# Patient Record
Sex: MALE | Race: WHITE | Employment: FULL TIME | URBAN - METROPOLITAN AREA
[De-identification: names, ages, dates, MRNs, and addresses within clinical notes are randomized per-mention and may not be internally consistent; named-entity substitution may affect disease eponyms.]

---

## 2024-05-16 ENCOUNTER — APPOINTMENT (OUTPATIENT)
Dept: NEUROLOGY | Age: 24
DRG: 917 | End: 2024-05-16
Payer: COMMERCIAL

## 2024-05-16 ENCOUNTER — APPOINTMENT (OUTPATIENT)
Dept: CT IMAGING | Age: 24
DRG: 917 | End: 2024-05-16
Payer: COMMERCIAL

## 2024-05-16 ENCOUNTER — APPOINTMENT (OUTPATIENT)
Dept: GENERAL RADIOLOGY | Age: 24
DRG: 917 | End: 2024-05-16
Payer: COMMERCIAL

## 2024-05-16 ENCOUNTER — HOSPITAL ENCOUNTER (INPATIENT)
Age: 24
LOS: 4 days | Discharge: PSYCHIATRIC OR SUBSTANCE USE FACILITY | DRG: 917 | End: 2024-05-20
Attending: EMERGENCY MEDICINE | Admitting: INTERNAL MEDICINE
Payer: COMMERCIAL

## 2024-05-16 DIAGNOSIS — T50.902D INTENTIONAL OVERDOSE, SUBSEQUENT ENCOUNTER: Primary | ICD-10-CM

## 2024-05-16 PROBLEM — T50.902A INTENTIONAL OVERDOSE (HCC): Status: ACTIVE | Noted: 2024-05-16

## 2024-05-16 LAB
ALBUMIN SERPL-MCNC: 4.6 G/DL (ref 3.5–5.2)
ALBUMIN SERPL-MCNC: 4.8 G/DL (ref 3.5–5.2)
ALP SERPL-CCNC: 80 U/L (ref 40–129)
ALP SERPL-CCNC: 86 U/L (ref 40–129)
ALT SERPL-CCNC: 10 U/L (ref 0–40)
ALT SERPL-CCNC: 12 U/L (ref 0–40)
AMPHET UR QL SCN: POSITIVE
ANION GAP SERPL CALCULATED.3IONS-SCNC: 13 MMOL/L (ref 7–16)
ANION GAP SERPL CALCULATED.3IONS-SCNC: 20 MMOL/L (ref 7–16)
APAP SERPL-MCNC: <5 UG/ML (ref 10–30)
AST SERPL-CCNC: 18 U/L (ref 0–39)
AST SERPL-CCNC: 20 U/L (ref 0–39)
BARBITURATES UR QL SCN: NEGATIVE
BASOPHILS # BLD: 0.14 K/UL (ref 0–0.2)
BASOPHILS # BLD: 0.17 K/UL (ref 0–0.2)
BASOPHILS NFR BLD: 1 % (ref 0–2)
BASOPHILS NFR BLD: 2 % (ref 0–2)
BENZODIAZ UR QL: POSITIVE
BILIRUB SERPL-MCNC: 0.2 MG/DL (ref 0–1.2)
BILIRUB SERPL-MCNC: 0.3 MG/DL (ref 0–1.2)
BUN SERPL-MCNC: 6 MG/DL (ref 6–20)
BUN SERPL-MCNC: 8 MG/DL (ref 6–20)
BUPRENORPHINE UR QL: NEGATIVE
CALCIUM SERPL-MCNC: 8.3 MG/DL (ref 8.6–10.2)
CALCIUM SERPL-MCNC: 9 MG/DL (ref 8.6–10.2)
CANNABINOIDS UR QL SCN: POSITIVE
CHLORIDE SERPL-SCNC: 103 MMOL/L (ref 98–107)
CHLORIDE SERPL-SCNC: 105 MMOL/L (ref 98–107)
CK SERPL-CCNC: 213 U/L (ref 20–200)
CO2 SERPL-SCNC: 16 MMOL/L (ref 22–29)
CO2 SERPL-SCNC: 27 MMOL/L (ref 22–29)
COCAINE UR QL SCN: NEGATIVE
CREAT SERPL-MCNC: 0.8 MG/DL (ref 0.7–1.2)
CREAT SERPL-MCNC: 0.9 MG/DL (ref 0.7–1.2)
EKG ATRIAL RATE: 105 BPM
EKG ATRIAL RATE: 109 BPM
EKG ATRIAL RATE: 129 BPM
EKG P AXIS: 79 DEGREES
EKG P AXIS: 83 DEGREES
EKG P AXIS: 86 DEGREES
EKG P-R INTERVAL: 150 MS
EKG P-R INTERVAL: 188 MS
EKG P-R INTERVAL: 190 MS
EKG Q-T INTERVAL: 320 MS
EKG Q-T INTERVAL: 346 MS
EKG Q-T INTERVAL: 348 MS
EKG QRS DURATION: 100 MS
EKG QRS DURATION: 88 MS
EKG QRS DURATION: 94 MS
EKG QTC CALCULATION (BAZETT): 459 MS
EKG QTC CALCULATION (BAZETT): 465 MS
EKG QTC CALCULATION (BAZETT): 468 MS
EKG R AXIS: 60 DEGREES
EKG R AXIS: 62 DEGREES
EKG R AXIS: 66 DEGREES
EKG T AXIS: 66 DEGREES
EKG T AXIS: 69 DEGREES
EKG T AXIS: 72 DEGREES
EKG VENTRICULAR RATE: 105 BPM
EKG VENTRICULAR RATE: 109 BPM
EKG VENTRICULAR RATE: 129 BPM
EOSINOPHIL # BLD: 0.01 K/UL (ref 0.05–0.5)
EOSINOPHIL # BLD: 0.16 K/UL (ref 0.05–0.5)
EOSINOPHILS RELATIVE PERCENT: 0 % (ref 0–6)
EOSINOPHILS RELATIVE PERCENT: 2 % (ref 0–6)
ERYTHROCYTE [DISTWIDTH] IN BLOOD BY AUTOMATED COUNT: 12.9 % (ref 11.5–15)
ERYTHROCYTE [DISTWIDTH] IN BLOOD BY AUTOMATED COUNT: 12.9 % (ref 11.5–15)
ETHANOLAMINE SERPL-MCNC: 43 MG/DL (ref 0–0.08)
FENTANYL UR QL: NEGATIVE
FIO2: 21
GFR, ESTIMATED: >90 ML/MIN/1.73M2
GFR, ESTIMATED: >90 ML/MIN/1.73M2
GLUCOSE SERPL-MCNC: 103 MG/DL (ref 74–99)
GLUCOSE SERPL-MCNC: 119 MG/DL (ref 74–99)
HCT VFR BLD AUTO: 41.5 % (ref 37–54)
HCT VFR BLD AUTO: 42.6 % (ref 37–54)
HGB BLD-MCNC: 13.6 G/DL (ref 12.5–16.5)
HGB BLD-MCNC: 14.4 G/DL (ref 12.5–16.5)
IMM GRANULOCYTES # BLD AUTO: 0.1 K/UL (ref 0–0.58)
IMM GRANULOCYTES # BLD AUTO: <0.03 K/UL (ref 0–0.58)
IMM GRANULOCYTES NFR BLD: 0 % (ref 0–5)
IMM GRANULOCYTES NFR BLD: 1 % (ref 0–5)
LACTATE BLDV-SCNC: 8.1 MMOL/L (ref 0.5–2.2)
LYMPHOCYTES NFR BLD: 1.09 K/UL (ref 1.5–4)
LYMPHOCYTES NFR BLD: 2.12 K/UL (ref 1.5–4)
LYMPHOCYTES RELATIVE PERCENT: 24 % (ref 20–42)
LYMPHOCYTES RELATIVE PERCENT: 6 % (ref 20–42)
MAGNESIUM SERPL-MCNC: 1.8 MG/DL (ref 1.6–2.6)
MAGNESIUM SERPL-MCNC: 2 MG/DL (ref 1.6–2.6)
MCH RBC QN AUTO: 27.9 PG (ref 26–35)
MCH RBC QN AUTO: 28.7 PG (ref 26–35)
MCHC RBC AUTO-ENTMCNC: 31.9 G/DL (ref 32–34.5)
MCHC RBC AUTO-ENTMCNC: 34.7 G/DL (ref 32–34.5)
MCV RBC AUTO: 82.7 FL (ref 80–99.9)
MCV RBC AUTO: 87.5 FL (ref 80–99.9)
METHADONE UR QL: NEGATIVE
MONOCYTES NFR BLD: 0.63 K/UL (ref 0.1–0.95)
MONOCYTES NFR BLD: 0.92 K/UL (ref 0.1–0.95)
MONOCYTES NFR BLD: 5 % (ref 2–12)
MONOCYTES NFR BLD: 7 % (ref 2–12)
NEUTROPHILS NFR BLD: 65 % (ref 43–80)
NEUTROPHILS NFR BLD: 88 % (ref 43–80)
NEUTS SEG NFR BLD: 16.71 K/UL (ref 1.8–7.3)
NEUTS SEG NFR BLD: 5.83 K/UL (ref 1.8–7.3)
O2 DELIVERY DEVICE: ABNORMAL
OPIATES UR QL SCN: NEGATIVE
OXYCODONE UR QL SCN: NEGATIVE
PCP UR QL SCN: NEGATIVE
PHOSPHATE SERPL-MCNC: 2.8 MG/DL (ref 2.5–4.5)
PLATELET # BLD AUTO: 378 K/UL (ref 130–450)
PLATELET # BLD AUTO: 390 K/UL (ref 130–450)
PMV BLD AUTO: 9.5 FL (ref 7–12)
PMV BLD AUTO: 9.5 FL (ref 7–12)
POC HCO3: 26.1 MMOL/L (ref 22–26)
POC O2 SATURATION: 98 % (ref 92–98.5)
POC PCO2: 41.6 MM HG (ref 35–45)
POC PH: 7.41 (ref 7.35–7.45)
POC PO2: 104.7 MM HG (ref 80–100)
POSITIVE BASE EXCESS, ART: 1.2 MMOL/L (ref 0–3)
POTASSIUM SERPL-SCNC: 3.2 MMOL/L (ref 3.5–5)
POTASSIUM SERPL-SCNC: 3.7 MMOL/L (ref 3.5–5)
PROCALCITONIN SERPL-MCNC: 0.04 NG/ML (ref 0–0.08)
PROT SERPL-MCNC: 7.1 G/DL (ref 6.4–8.3)
PROT SERPL-MCNC: 7.3 G/DL (ref 6.4–8.3)
RBC # BLD AUTO: 4.87 M/UL (ref 3.8–5.8)
RBC # BLD AUTO: 5.02 M/UL (ref 3.8–5.8)
SALICYLATES SERPL-MCNC: <0.3 MG/DL (ref 0–30)
SAMPLE SITE: ABNORMAL
SODIUM SERPL-SCNC: 141 MMOL/L (ref 132–146)
SODIUM SERPL-SCNC: 143 MMOL/L (ref 132–146)
T4 FREE SERPL-MCNC: 1.5 NG/DL (ref 0.9–1.7)
TEST INFORMATION: ABNORMAL
TOXIC TRICYCLIC SC,BLOOD: NEGATIVE
TSH SERPL DL<=0.05 MIU/L-ACNC: 5.98 UIU/ML (ref 0.27–4.2)
WBC OTHER # BLD: 19 K/UL (ref 4.5–11.5)
WBC OTHER # BLD: 8.9 K/UL (ref 4.5–11.5)

## 2024-05-16 PROCEDURE — 83735 ASSAY OF MAGNESIUM: CPT

## 2024-05-16 PROCEDURE — C9113 INJ PANTOPRAZOLE SODIUM, VIA: HCPCS | Performed by: INTERNAL MEDICINE

## 2024-05-16 PROCEDURE — 80143 DRUG ASSAY ACETAMINOPHEN: CPT

## 2024-05-16 PROCEDURE — 93010 ELECTROCARDIOGRAM REPORT: CPT | Performed by: INTERNAL MEDICINE

## 2024-05-16 PROCEDURE — 2000000000 HC ICU R&B

## 2024-05-16 PROCEDURE — 93005 ELECTROCARDIOGRAM TRACING: CPT | Performed by: STUDENT IN AN ORGANIZED HEALTH CARE EDUCATION/TRAINING PROGRAM

## 2024-05-16 PROCEDURE — 6360000002 HC RX W HCPCS: Performed by: INTERNAL MEDICINE

## 2024-05-16 PROCEDURE — 84439 ASSAY OF FREE THYROXINE: CPT

## 2024-05-16 PROCEDURE — 83605 ASSAY OF LACTIC ACID: CPT

## 2024-05-16 PROCEDURE — 93005 ELECTROCARDIOGRAM TRACING: CPT | Performed by: EMERGENCY MEDICINE

## 2024-05-16 PROCEDURE — 99222 1ST HOSP IP/OBS MODERATE 55: CPT | Performed by: INTERNAL MEDICINE

## 2024-05-16 PROCEDURE — 96365 THER/PROPH/DIAG IV INF INIT: CPT

## 2024-05-16 PROCEDURE — 2580000003 HC RX 258: Performed by: STUDENT IN AN ORGANIZED HEALTH CARE EDUCATION/TRAINING PROGRAM

## 2024-05-16 PROCEDURE — 80179 DRUG ASSAY SALICYLATE: CPT

## 2024-05-16 PROCEDURE — G0480 DRUG TEST DEF 1-7 CLASSES: HCPCS

## 2024-05-16 PROCEDURE — 95816 EEG AWAKE AND DROWSY: CPT

## 2024-05-16 PROCEDURE — 84145 PROCALCITONIN (PCT): CPT

## 2024-05-16 PROCEDURE — 93005 ELECTROCARDIOGRAM TRACING: CPT | Performed by: INTERNAL MEDICINE

## 2024-05-16 PROCEDURE — 80307 DRUG TEST PRSMV CHEM ANLYZR: CPT

## 2024-05-16 PROCEDURE — 6360000002 HC RX W HCPCS: Performed by: STUDENT IN AN ORGANIZED HEALTH CARE EDUCATION/TRAINING PROGRAM

## 2024-05-16 PROCEDURE — 99223 1ST HOSP IP/OBS HIGH 75: CPT | Performed by: PSYCHIATRY & NEUROLOGY

## 2024-05-16 PROCEDURE — 85025 COMPLETE CBC W/AUTO DIFF WBC: CPT

## 2024-05-16 PROCEDURE — 99291 CRITICAL CARE FIRST HOUR: CPT | Performed by: INTERNAL MEDICINE

## 2024-05-16 PROCEDURE — 84100 ASSAY OF PHOSPHORUS: CPT

## 2024-05-16 PROCEDURE — 6360000002 HC RX W HCPCS

## 2024-05-16 PROCEDURE — 99285 EMERGENCY DEPT VISIT HI MDM: CPT

## 2024-05-16 PROCEDURE — 71045 X-RAY EXAM CHEST 1 VIEW: CPT

## 2024-05-16 PROCEDURE — 2580000003 HC RX 258: Performed by: EMERGENCY MEDICINE

## 2024-05-16 PROCEDURE — 70450 CT HEAD/BRAIN W/O DYE: CPT

## 2024-05-16 PROCEDURE — 2580000003 HC RX 258: Performed by: INTERNAL MEDICINE

## 2024-05-16 PROCEDURE — 95816 EEG AWAKE AND DROWSY: CPT | Performed by: PSYCHIATRY & NEUROLOGY

## 2024-05-16 PROCEDURE — 82550 ASSAY OF CK (CPK): CPT

## 2024-05-16 PROCEDURE — 84443 ASSAY THYROID STIM HORMONE: CPT

## 2024-05-16 PROCEDURE — 96366 THER/PROPH/DIAG IV INF ADDON: CPT

## 2024-05-16 PROCEDURE — A4216 STERILE WATER/SALINE, 10 ML: HCPCS | Performed by: INTERNAL MEDICINE

## 2024-05-16 PROCEDURE — 80053 COMPREHEN METABOLIC PANEL: CPT

## 2024-05-16 PROCEDURE — 96375 TX/PRO/DX INJ NEW DRUG ADDON: CPT

## 2024-05-16 PROCEDURE — 82803 BLOOD GASES ANY COMBINATION: CPT

## 2024-05-16 RX ORDER — HEPARIN SODIUM 5000 [USP'U]/ML
5000 INJECTION, SOLUTION INTRAVENOUS; SUBCUTANEOUS EVERY 8 HOURS
Status: DISCONTINUED | OUTPATIENT
Start: 2024-05-16 | End: 2024-05-19 | Stop reason: HOSPADM

## 2024-05-16 RX ORDER — LEVETIRACETAM 500 MG/5ML
1000 INJECTION, SOLUTION, CONCENTRATE INTRAVENOUS EVERY 12 HOURS
Status: DISCONTINUED | OUTPATIENT
Start: 2024-05-16 | End: 2024-05-19 | Stop reason: HOSPADM

## 2024-05-16 RX ORDER — LORAZEPAM 2 MG/ML
INJECTION INTRAMUSCULAR
Status: COMPLETED
Start: 2024-05-16 | End: 2024-05-16

## 2024-05-16 RX ORDER — LEVETIRACETAM 500 MG/5ML
INJECTION, SOLUTION, CONCENTRATE INTRAVENOUS
Status: COMPLETED
Start: 2024-05-16 | End: 2024-05-16

## 2024-05-16 RX ORDER — POTASSIUM CHLORIDE 7.45 MG/ML
10 INJECTION INTRAVENOUS
Status: DISPENSED | OUTPATIENT
Start: 2024-05-16 | End: 2024-05-16

## 2024-05-16 RX ORDER — ONDANSETRON 2 MG/ML
4 INJECTION INTRAMUSCULAR; INTRAVENOUS ONCE
Status: COMPLETED | OUTPATIENT
Start: 2024-05-16 | End: 2024-05-16

## 2024-05-16 RX ORDER — LORAZEPAM 2 MG/ML
0.5 INJECTION INTRAMUSCULAR ONCE
Status: COMPLETED | OUTPATIENT
Start: 2024-05-16 | End: 2024-05-16

## 2024-05-16 RX ORDER — LEVETIRACETAM 500 MG/5ML
1500 INJECTION, SOLUTION, CONCENTRATE INTRAVENOUS ONCE
Status: COMPLETED | OUTPATIENT
Start: 2024-05-16 | End: 2024-05-16

## 2024-05-16 RX ORDER — SODIUM CHLORIDE, SODIUM LACTATE, POTASSIUM CHLORIDE, CALCIUM CHLORIDE 600; 310; 30; 20 MG/100ML; MG/100ML; MG/100ML; MG/100ML
INJECTION, SOLUTION INTRAVENOUS CONTINUOUS
Status: DISCONTINUED | OUTPATIENT
Start: 2024-05-16 | End: 2024-05-18

## 2024-05-16 RX ORDER — 0.9 % SODIUM CHLORIDE 0.9 %
1000 INTRAVENOUS SOLUTION INTRAVENOUS ONCE
Status: COMPLETED | OUTPATIENT
Start: 2024-05-16 | End: 2024-05-16

## 2024-05-16 RX ORDER — LORAZEPAM 2 MG/ML
2 INJECTION INTRAMUSCULAR ONCE
Status: COMPLETED | OUTPATIENT
Start: 2024-05-16 | End: 2024-05-16

## 2024-05-16 RX ADMIN — SODIUM CHLORIDE, POTASSIUM CHLORIDE, SODIUM LACTATE AND CALCIUM CHLORIDE: 600; 310; 30; 20 INJECTION, SOLUTION INTRAVENOUS at 23:16

## 2024-05-16 RX ADMIN — LORAZEPAM 0.5 MG: 2 INJECTION INTRAMUSCULAR; INTRAVENOUS at 07:50

## 2024-05-16 RX ADMIN — SODIUM CHLORIDE 1000 ML: 9 INJECTION, SOLUTION INTRAVENOUS at 08:57

## 2024-05-16 RX ADMIN — POTASSIUM CHLORIDE 10 MEQ: 7.46 INJECTION, SOLUTION INTRAVENOUS at 07:29

## 2024-05-16 RX ADMIN — PANTOPRAZOLE SODIUM 40 MG: 40 INJECTION, POWDER, FOR SOLUTION INTRAVENOUS at 13:17

## 2024-05-16 RX ADMIN — LORAZEPAM 2 MG: 2 INJECTION INTRAMUSCULAR at 11:18

## 2024-05-16 RX ADMIN — LEVETIRACETAM 1000 MG: 100 INJECTION, SOLUTION INTRAVENOUS at 23:16

## 2024-05-16 RX ADMIN — SODIUM CHLORIDE 1000 ML: 9 INJECTION, SOLUTION INTRAVENOUS at 08:02

## 2024-05-16 RX ADMIN — ONDANSETRON HYDROCHLORIDE 4 MG: 2 INJECTION, SOLUTION INTRAMUSCULAR; INTRAVENOUS at 07:50

## 2024-05-16 RX ADMIN — LORAZEPAM 2 MG: 2 INJECTION INTRAMUSCULAR; INTRAVENOUS at 11:18

## 2024-05-16 RX ADMIN — LEVETIRACETAM 1500 MG: 100 INJECTION, SOLUTION INTRAVENOUS at 11:17

## 2024-05-16 RX ADMIN — LEVETIRACETAM 1500 MG: 500 INJECTION, SOLUTION, CONCENTRATE INTRAVENOUS at 11:17

## 2024-05-16 RX ADMIN — HEPARIN SODIUM 5000 UNITS: 5000 INJECTION INTRAVENOUS; SUBCUTANEOUS at 21:58

## 2024-05-16 RX ADMIN — POTASSIUM CHLORIDE 10 MEQ: 7.46 INJECTION, SOLUTION INTRAVENOUS at 08:57

## 2024-05-16 RX ADMIN — HEPARIN SODIUM 5000 UNITS: 5000 INJECTION INTRAVENOUS; SUBCUTANEOUS at 13:17

## 2024-05-16 RX ADMIN — SODIUM CHLORIDE, POTASSIUM CHLORIDE, SODIUM LACTATE AND CALCIUM CHLORIDE: 600; 310; 30; 20 INJECTION, SOLUTION INTRAVENOUS at 12:32

## 2024-05-16 NOTE — PROCEDURES
[facility] Neurodiagnostic Report    MRN: [id number]  PATIENT NAME: Ho Meade  DATE OF REPORT: 2024    DATE OF SERVICE: 2024  PHYSICIAN NAME: Harris Guallpa DO  STUDY ORDERED BY: Dr Barbosa      Patient's : 2000  Patient's Age: 23 y.o.  Gender: male    PROCEDURE: Routine EEG with video      Clinical Interpretation:   This abnormal study showed evidence of:    A moderate nonspecific encephalopathy    No seizures or epileptiform discharges were noted during this study.      ____________________________  Electronically signed by: Harris Guallpa DO, 2024 3:23 PM      Patient Clinical Information   Reason for Study: The patient is undergoing evaluation for seizure(s)  Patient State: Stuporous  Primary neurological diagnosis: Seizure  Primary indication for monitoring: Diagnosis of nonconvulsive seizures    Pertinent Medications and Treatments    levetiracetam     Sedatives administered: No  Intubated: No  Pharmacological paralytic: No    Reporting Period  Start of Study: 1321, 2024   End of Study:  1343, 2024       EEG Description  Digital video and scalp EEG monitoring was performed using the standard protocol for this laboratory. Scalp electrodes were applied in the international 10/20 system. Multiple digital montage arrangements were utilized for evaluation. EKG and video were recorded.     Background:      Occipital rhythm (posterior dominant rhythm or PDR): Absent   Voltage: low to medium  Organization: fair  Reactivity to eye opening/closure: minimal    Drowsiness: present - minimally excessive irregular delta activity noted  Sleep: Absent    Comments: There is moderately excessive generalized irregular beta activity noted consistent with medication effect.    Technical and Activation Procedures:  Hyperventilation: Not done  Photic stimulation: Done - physiologic driving noted  Reactivity to stimulation: Present    Abnormalities:    I.

## 2024-05-16 NOTE — H&P
[facility] Hospitalist Group History and Physical          PCP: No primary care provider on file.    Date of Admission: 5/16/2024    Date of Service: Pt seen/examined on 5/16/2024 and is admitted to Inpatient with expected LOS greater than two midnights due to medical therapy.     Chief Complaint:  had concerns including Drug Overdose (Pt states he was trying to end his life and took a whole bottle of medications ).    History Of Present Illness:    Mr. Ho Meade, a 23 y.o. year old male  who  has no past medical history on file.  Patient presented to the [place] emergency department due to intentional overdose on Wellbutrin.  He reportedly took 20 pills of Wellbutrin last night.  Patient was stable in the emergency department decision was made to transfer patient to ICU for monitoring.  However, on the way to [facility], he was noted to have seizure-like activity lasting for about 20 seconds.  He again had a witnessed generalized tonic-clonic seizure lasting about 40 seconds on arrival to the ICU. He was loaded with Keppra and stat EEG and neurology consult ordered.       Past Medical History:    History reviewed. No pertinent past medical history.    Past Surgical History:    History reviewed. No pertinent surgical history.    Medications Prior to Admission:      Prior to Admission medications    Not on File       Allergies:  Patient has no known allergies.    Social History:    TOBACCO:   reports that he has been smoking cigarettes and e-cigarettes. He does not have any smokeless tobacco history on file.  ETOH:   has no history on file for alcohol use.    Family History:    Reviewed in detail and negative for DM, CAD, Cancer, CVA. Positive as follows\"  History reviewed. No pertinent family history.    REVIEW OF SYSTEMS:   Pertinent positives as noted in the HPI. All other systems reviewed and negative.    PHYSICAL EXAM:  /86   Pulse (!) 106   Temp 97.7 °F (36.5 °C)

## 2024-05-16 NOTE — ED PROVIDER NOTES
HPI:  5/16/24, Time: 5:48 AM EDT         Ho Meade is a 23 y.o. male presenting to the ED for suicidal, beginning midnight about 6 hours ago ago.  The complaint has been persistent, severe in severity, and worsened by nothing.  Patient seems somewhat slow to respond slightly tachycardic he did not have any seizures there is been no trauma this was definitely a suicide attempt to end his life I did contact poison control that said that he needs to be watched on the monitor possible ICU for at least 24 hours possible seizures or QT prolongation or widening of the QRS, he has no trauma at this point his pupils equal round reactive to light he has no focal neurological deficits is very slow to respond    ROS:   Pertinent positives and negatives are stated within HPI, all other systems reviewed and are negative.  --------------------------------------------- PAST HISTORY ---------------------------------------------  Past Medical History:  has no past medical history on file.    Past Surgical History:  has no past surgical history on file.    Social History:  reports that he has been smoking cigarettes and e-cigarettes. He does not have any smokeless tobacco history on file. He reports current drug use. Drugs: Marijuana (Weed) and Other-see comments.    Family History: family history is not on file.     The patient’s home medications have been reviewed.    Allergies: Patient has no known allergies.    ---------------------------------------------------PHYSICAL EXAM--------------------------------------  }  Constitutional/General: Alert and oriented x2, well appearing, non toxic in NAD slow to respond year wrong and president wroong  Head: Normocephalic and atraumatic  Eyes: PERRL, EOMI  Mouth: Oropharynx clear, handling secretions, no trismus  Neck: Supple, full ROM, non tender to palpation in the midline, no stridor, no crepitus, no meningeal signs  Pulmonary: Lungs clear to auscultation bilaterally, no wheezes,

## 2024-05-17 PROBLEM — R56.9 PROVOKED SEIZURE (HCC): Status: ACTIVE | Noted: 2024-05-17

## 2024-05-17 LAB
ALBUMIN SERPL-MCNC: 3.9 G/DL (ref 3.5–5.2)
ALP SERPL-CCNC: 69 U/L (ref 40–129)
ALT SERPL-CCNC: 10 U/L (ref 0–40)
ANION GAP SERPL CALCULATED.3IONS-SCNC: 11 MMOL/L (ref 7–16)
AST SERPL-CCNC: 15 U/L (ref 0–39)
BASOPHILS # BLD: 0.04 K/UL (ref 0–0.2)
BASOPHILS NFR BLD: 0 % (ref 0–2)
BILIRUB SERPL-MCNC: 0.5 MG/DL (ref 0–1.2)
BUN SERPL-MCNC: 4 MG/DL (ref 6–20)
CALCIUM SERPL-MCNC: 8.6 MG/DL (ref 8.6–10.2)
CHLORIDE SERPL-SCNC: 107 MMOL/L (ref 98–107)
CO2 SERPL-SCNC: 23 MMOL/L (ref 22–29)
CREAT SERPL-MCNC: 0.8 MG/DL (ref 0.7–1.2)
EOSINOPHIL # BLD: 0 K/UL (ref 0.05–0.5)
EOSINOPHILS RELATIVE PERCENT: 0 % (ref 0–6)
ERYTHROCYTE [DISTWIDTH] IN BLOOD BY AUTOMATED COUNT: 13.2 % (ref 11.5–15)
GFR, ESTIMATED: >90 ML/MIN/1.73M2
GLUCOSE SERPL-MCNC: 96 MG/DL (ref 74–99)
HCT VFR BLD AUTO: 37 % (ref 37–54)
HGB BLD-MCNC: 12.5 G/DL (ref 12.5–16.5)
IMM GRANULOCYTES # BLD AUTO: 0.04 K/UL (ref 0–0.58)
IMM GRANULOCYTES NFR BLD: 0 % (ref 0–5)
INR PPP: 1.2
LACTATE BLDV-SCNC: 0.9 MMOL/L (ref 0.5–2.2)
LYMPHOCYTES NFR BLD: 1 K/UL (ref 1.5–4)
LYMPHOCYTES RELATIVE PERCENT: 9 % (ref 20–42)
MAGNESIUM SERPL-MCNC: 1.7 MG/DL (ref 1.6–2.6)
MCH RBC QN AUTO: 28.9 PG (ref 26–35)
MCHC RBC AUTO-ENTMCNC: 33.8 G/DL (ref 32–34.5)
MCV RBC AUTO: 85.5 FL (ref 80–99.9)
MONOCYTES NFR BLD: 0.89 K/UL (ref 0.1–0.95)
MONOCYTES NFR BLD: 8 % (ref 2–12)
NEUTROPHILS NFR BLD: 81 % (ref 43–80)
NEUTS SEG NFR BLD: 8.63 K/UL (ref 1.8–7.3)
PARTIAL THROMBOPLASTIN TIME: 35.3 SEC (ref 24.5–35.1)
PHOSPHATE SERPL-MCNC: 2.7 MG/DL (ref 2.5–4.5)
PLATELET # BLD AUTO: 334 K/UL (ref 130–450)
PMV BLD AUTO: 9.6 FL (ref 7–12)
POTASSIUM SERPL-SCNC: 3.5 MMOL/L (ref 3.5–5)
PROT SERPL-MCNC: 6.1 G/DL (ref 6.4–8.3)
PROTHROMBIN TIME: 12.7 SEC (ref 9.3–12.4)
RBC # BLD AUTO: 4.33 M/UL (ref 3.8–5.8)
SODIUM SERPL-SCNC: 141 MMOL/L (ref 132–146)
WBC OTHER # BLD: 10.6 K/UL (ref 4.5–11.5)

## 2024-05-17 PROCEDURE — 99232 SBSQ HOSP IP/OBS MODERATE 35: CPT

## 2024-05-17 PROCEDURE — 80053 COMPREHEN METABOLIC PANEL: CPT

## 2024-05-17 PROCEDURE — 6360000002 HC RX W HCPCS: Performed by: INTERNAL MEDICINE

## 2024-05-17 PROCEDURE — C9113 INJ PANTOPRAZOLE SODIUM, VIA: HCPCS | Performed by: INTERNAL MEDICINE

## 2024-05-17 PROCEDURE — A4216 STERILE WATER/SALINE, 10 ML: HCPCS | Performed by: INTERNAL MEDICINE

## 2024-05-17 PROCEDURE — 99232 SBSQ HOSP IP/OBS MODERATE 35: CPT | Performed by: STUDENT IN AN ORGANIZED HEALTH CARE EDUCATION/TRAINING PROGRAM

## 2024-05-17 PROCEDURE — 2580000003 HC RX 258: Performed by: INTERNAL MEDICINE

## 2024-05-17 PROCEDURE — 99291 CRITICAL CARE FIRST HOUR: CPT | Performed by: INTERNAL MEDICINE

## 2024-05-17 PROCEDURE — 85730 THROMBOPLASTIN TIME PARTIAL: CPT

## 2024-05-17 PROCEDURE — 83605 ASSAY OF LACTIC ACID: CPT

## 2024-05-17 PROCEDURE — 85610 PROTHROMBIN TIME: CPT

## 2024-05-17 PROCEDURE — 85025 COMPLETE CBC W/AUTO DIFF WBC: CPT

## 2024-05-17 PROCEDURE — 83735 ASSAY OF MAGNESIUM: CPT

## 2024-05-17 PROCEDURE — 2000000000 HC ICU R&B

## 2024-05-17 PROCEDURE — 84100 ASSAY OF PHOSPHORUS: CPT

## 2024-05-17 PROCEDURE — 6360000002 HC RX W HCPCS: Performed by: NURSE PRACTITIONER

## 2024-05-17 RX ORDER — MAGNESIUM SULFATE IN WATER 40 MG/ML
2000 INJECTION, SOLUTION INTRAVENOUS ONCE
Status: COMPLETED | OUTPATIENT
Start: 2024-05-17 | End: 2024-05-17

## 2024-05-17 RX ORDER — POTASSIUM CHLORIDE 20 MEQ/1
20 TABLET, EXTENDED RELEASE ORAL ONCE
Status: DISCONTINUED | OUTPATIENT
Start: 2024-05-17 | End: 2024-05-20 | Stop reason: HOSPADM

## 2024-05-17 RX ORDER — ONDANSETRON 2 MG/ML
4 INJECTION INTRAMUSCULAR; INTRAVENOUS EVERY 6 HOURS PRN
Status: DISCONTINUED | OUTPATIENT
Start: 2024-05-17 | End: 2024-05-20 | Stop reason: HOSPADM

## 2024-05-17 RX ORDER — ONDANSETRON 2 MG/ML
8 INJECTION INTRAMUSCULAR; INTRAVENOUS ONCE
Status: COMPLETED | OUTPATIENT
Start: 2024-05-17 | End: 2024-05-17

## 2024-05-17 RX ADMIN — HEPARIN SODIUM 5000 UNITS: 5000 INJECTION INTRAVENOUS; SUBCUTANEOUS at 21:51

## 2024-05-17 RX ADMIN — ONDANSETRON 8 MG: 2 INJECTION INTRAMUSCULAR; INTRAVENOUS at 08:33

## 2024-05-17 RX ADMIN — SODIUM CHLORIDE, POTASSIUM CHLORIDE, SODIUM LACTATE AND CALCIUM CHLORIDE: 600; 310; 30; 20 INJECTION, SOLUTION INTRAVENOUS at 10:44

## 2024-05-17 RX ADMIN — SODIUM CHLORIDE, POTASSIUM CHLORIDE, SODIUM LACTATE AND CALCIUM CHLORIDE: 600; 310; 30; 20 INJECTION, SOLUTION INTRAVENOUS at 20:49

## 2024-05-17 RX ADMIN — PANTOPRAZOLE SODIUM 40 MG: 40 INJECTION, POWDER, FOR SOLUTION INTRAVENOUS at 08:33

## 2024-05-17 RX ADMIN — LEVETIRACETAM 1000 MG: 100 INJECTION, SOLUTION INTRAVENOUS at 11:14

## 2024-05-17 RX ADMIN — HEPARIN SODIUM 5000 UNITS: 5000 INJECTION INTRAVENOUS; SUBCUTANEOUS at 05:44

## 2024-05-17 RX ADMIN — HEPARIN SODIUM 5000 UNITS: 5000 INJECTION INTRAVENOUS; SUBCUTANEOUS at 13:19

## 2024-05-17 RX ADMIN — LEVETIRACETAM 1000 MG: 100 INJECTION, SOLUTION INTRAVENOUS at 22:55

## 2024-05-17 RX ADMIN — MAGNESIUM SULFATE HEPTAHYDRATE 2000 MG: 40 INJECTION, SOLUTION INTRAVENOUS at 06:45

## 2024-05-17 NOTE — PLAN OF CARE
Problem: Skin/Tissue Integrity  Goal: Absence of new skin breakdown  Description: 1.  Monitor for areas of redness and/or skin breakdown  2.  Assess vascular access sites hourly  3.  Every 4-6 hours minimum:  Change oxygen saturation probe site  4.  Every 4-6 hours:  If on nasal continuous positive airway pressure, respiratory therapy assess nares and determine need for appliance change or resting period.  Outcome: Progressing     Problem: Safety - Adult  Goal: Free from fall injury  Outcome: Progressing     Problem: ABCDS Injury Assessment  Goal: Absence of physical injury  Outcome: Progressing     Problem: Pain  Goal: Verbalizes/displays adequate comfort level or baseline comfort level  Outcome: Progressing

## 2024-05-17 NOTE — CONSULTS
Department of Psychiatry  Behavioral Health Consult    REASON FOR CONSULT: Intentional overdose    CONSULTING PHYSICIAN: Qamar Barbosa MD     History obtained from: Patient interview chart review    HISTORY OF PRESENT ILLNESS:     The patient is a 23 y.o. male with significant past psychiatric history of depression who presents with intentional Wellbutrin overdose initially presenting to the [facility] emergency department and was transferred to [facility] and [place] [facility] he did have witnessed seizure-like activity in route to the [facility] was provided a loading dose of Keppra and a stat EEG neurology was consulted.  Psychiatry has been consulted for intentional overdose.    Patient is pink slip for intentional overdose.  He is pink slipped for intentional overdose.    Once he is medically stabilized please call extension [phone] to facilitate transfer to inpatient behavioral health or referral to [facility] if [facility] is at capacity.           Past Medical History:    History reviewed. No pertinent past medical history.    Past Surgical History:    History reviewed. No pertinent surgical history.    Medications Prior to Admission:   No medications prior to admission.    Allergies:  Patient has no known allergies.    FAMILY/SOCIAL HISTORY:  History reviewed. No pertinent family history.  Social History     Socioeconomic History    Marital status: Single     Spouse name: Not on file    Number of children: Not on file    Years of education: Not on file    Highest education level: Not on file   Occupational History    Not on file   Tobacco Use    Smoking status: Every Day     Types: Cigarettes, E-Cigarettes    Smokeless tobacco: Not on file   Substance and Sexual Activity    Alcohol use: Not on file    Drug use: Yes     Types: Marijuana (Weed), Other-see comments     Comment: Adderall    Sexual activity: Not on file   Other Topics Concern    Not on file   Social History 
[facility]  Neurology Consult    Date:  5/16/2024  Patient Name:  Ho Meade  YOB: 2000  MRN: [id number]     PCP:  No primary care provider on file.   Referring:  No ref. provider found      Chief Complaint: seizure    History obtained from: patient's family, chart    Assessment  Ho Meade is a 23 y.o. male admitted following Wellbutrin overdose with subsequent provoked seizures. Given the half-life of the medication he has been having worsening mentation over the course of the day.       Plan  EEG done today without any evidence of epileptiform discharges  MRI brain w/wo contrast pending  Continue Keppra for present, will likely not be needed long term  Seizure safety precautions        History of Present Illness:  Ho Meade is a 23 y.o. right handed male presenting for evaluation of seizure in the setting of suspected Wellbutrin overdose. The patient's parents report that he is suspected to have taken an overdose of his Wellbutrin sometime early the morning of admission vs night before. He initially presented to the [facility] ED. En route to [facility] he had a brief tonic clonic seizure and had another again in the MICU.     Since transfer he has also become more somnolent and has reportedly been hallucinating.       Social History:  Social History     Tobacco Use    Smoking status: Every Day     Types: Cigarettes, E-Cigarettes   Substance Use Topics    Drug use: Yes     Types: Marijuana (Weed), Other-see comments     Comment: Adderall        Current Medications:      Current Facility-Administered Medications   Medication Dose Route Frequency Provider Last Rate Last Admin    levETIRAcetam (KEPPRA) injection 1,000 mg  1,000 mg IntraVENous Q12H Qamar Barbosa MD        pantoprazole (PROTONIX) 40 mg in sodium chloride (PF) 0.9 % 10 mL injection  40 mg IntraVENous Daily Qamar Barbosa MD   40 mg at 05/16/24 1317    heparin (porcine) injection 5,000 Units  5,000 
with 20 pills  Suspected GTCS witnessed in MICU for 30 to 40 seconds  Suspected drug-induced seizures  Immediate regaining of consciousness and started following commands within 5 minutes  History of dysthymia  Suspected MDD    Plan:    Neuro: Following commands, no focal neurodeficit.  Loaded with Keppra 1500 mg and continue Keppra 1000 mg every 12, Ativan 2 mg given during weakness G TCS.  EEG stat ordered, reviewed CT scan of the head from [facility] without any acute abnormality.  Poison control was contacted by [facility] and they recommended to monitor electrolytes and replace, monitor lactic acid, monitor QTc.  Pulmonary: Comfortable in room air maintaining SpO2 96%  Cardiovascular: Hemodynamically stable, monitor QTc  Abdomen/GI: Keep n.p.o. for now  Renal: Monitor urine output, monitor electrolytes and replace electrolytes.  MSK: No active issues   Skin: No active issues   Hematology: No active issues  Endocrine: Monitor BS  DVT/GI: Prophylaxis:  Code Status: Full Code  Disposition: ICU  Total Critical care time spent  35 mins. This did not include any procedures.         Qamar Barbosa MD  Critical Care

## 2024-05-17 NOTE — CARE COORDINATION
CM transition of care.  Patient presented to [facility] ED secondary to concerns for drug overdose.  Reportedly pt stated he was trying to end his life and took 20 Wellbutrin pills.  Pt admitted to MICU.  Had seizure like activity in route to main hospital as well as on presentation to the the unit.  Neurology and psychiatry on consult.  EEG completed yesterday.  MRI Brain pending.  IV Keppra q 12 and IVF @ 100 ml/hr.  CO at the beside.  Met with patient mother at the bedside.  Pt lives alone and is normally independent, drives and works full time job.  He does see a therapist at [facility], however his therapist has been on maternity leave and he has not seen anyone else in the interim.  Discharge plan pending psychiatry input and plan.  CM/ANTONIO to follow.  Loki Pena RN  [phone].      Case Management Assessment  Initial Evaluation    Date/Time of Evaluation: 5/17/2024 11:50 AM  Assessment Completed by: Jeaneth Pena RN    If patient is discharged prior to next notation, then this note serves as note for discharge by case management.    Patient Name: Ho Meade                   YOB: 2000  Diagnosis: Intentional overdose, subsequent encounter [T50.902D]  Intentional drug overdose, subsequent encounter [T50.902D]                   Date / Time: 5/16/2024  5:31 AM    Patient Admission Status: Inpatient   Readmission Risk (Low < 19, Mod (19-27), High > 27): Readmission Risk Score: 10    Current PCP: No primary care provider on file.  PCP verified by CM? Yes    Chart Reviewed:       History Provided by: Child/Family  Patient Orientation: Alert and Oriented    Patient Cognition: Alert    Hospitalization in the last 30 days (Readmission):  No    If yes, Readmission Assessment in  Navigator will be completed.    Advance Directives:      Code Status: Full Code   Patient's Primary Decision Maker is:      Primary Decision Maker: jasmyn malin - Aunt/Uncle - [phone]    Primary Decision Maker:

## 2024-05-18 ENCOUNTER — APPOINTMENT (OUTPATIENT)
Dept: MRI IMAGING | Age: 24
DRG: 917 | End: 2024-05-18
Payer: COMMERCIAL

## 2024-05-18 LAB
ALBUMIN SERPL-MCNC: 4.1 G/DL (ref 3.5–5.2)
ALP SERPL-CCNC: 69 U/L (ref 40–129)
ALT SERPL-CCNC: 11 U/L (ref 0–40)
ANION GAP SERPL CALCULATED.3IONS-SCNC: 14 MMOL/L (ref 7–16)
AST SERPL-CCNC: 20 U/L (ref 0–39)
BASOPHILS # BLD: 0.12 K/UL (ref 0–0.2)
BASOPHILS NFR BLD: 1 % (ref 0–2)
BILIRUB SERPL-MCNC: 0.5 MG/DL (ref 0–1.2)
BUN SERPL-MCNC: 6 MG/DL (ref 6–20)
CALCIUM SERPL-MCNC: 8.8 MG/DL (ref 8.6–10.2)
CHLORIDE SERPL-SCNC: 103 MMOL/L (ref 98–107)
CO2 SERPL-SCNC: 21 MMOL/L (ref 22–29)
CREAT SERPL-MCNC: 0.8 MG/DL (ref 0.7–1.2)
EOSINOPHIL # BLD: 0.13 K/UL (ref 0.05–0.5)
EOSINOPHILS RELATIVE PERCENT: 1 % (ref 0–6)
ERYTHROCYTE [DISTWIDTH] IN BLOOD BY AUTOMATED COUNT: 13.5 % (ref 11.5–15)
GFR, ESTIMATED: >90 ML/MIN/1.73M2
GLUCOSE SERPL-MCNC: 85 MG/DL (ref 74–99)
HCT VFR BLD AUTO: 40.6 % (ref 37–54)
HGB BLD-MCNC: 13.5 G/DL (ref 12.5–16.5)
IMM GRANULOCYTES # BLD AUTO: 0.04 K/UL (ref 0–0.58)
IMM GRANULOCYTES NFR BLD: 0 % (ref 0–5)
INR PPP: 1.1
LYMPHOCYTES NFR BLD: 2.1 K/UL (ref 1.5–4)
LYMPHOCYTES RELATIVE PERCENT: 23 % (ref 20–42)
MAGNESIUM SERPL-MCNC: 2.1 MG/DL (ref 1.6–2.6)
MCH RBC QN AUTO: 29 PG (ref 26–35)
MCHC RBC AUTO-ENTMCNC: 33.3 G/DL (ref 32–34.5)
MCV RBC AUTO: 87.1 FL (ref 80–99.9)
MONOCYTES NFR BLD: 0.9 K/UL (ref 0.1–0.95)
MONOCYTES NFR BLD: 10 % (ref 2–12)
NEUTROPHILS NFR BLD: 64 % (ref 43–80)
NEUTS SEG NFR BLD: 5.85 K/UL (ref 1.8–7.3)
PARTIAL THROMBOPLASTIN TIME: 38.3 SEC (ref 24.5–35.1)
PHOSPHATE SERPL-MCNC: 3.2 MG/DL (ref 2.5–4.5)
PLATELET # BLD AUTO: 317 K/UL (ref 130–450)
PMV BLD AUTO: 9.7 FL (ref 7–12)
POTASSIUM SERPL-SCNC: 3.9 MMOL/L (ref 3.5–5)
PROT SERPL-MCNC: 6.6 G/DL (ref 6.4–8.3)
PROTHROMBIN TIME: 11.6 SEC (ref 9.3–12.4)
RBC # BLD AUTO: 4.66 M/UL (ref 3.8–5.8)
SODIUM SERPL-SCNC: 138 MMOL/L (ref 132–146)
WBC OTHER # BLD: 9.1 K/UL (ref 4.5–11.5)

## 2024-05-18 PROCEDURE — A9577 INJ MULTIHANCE: HCPCS | Performed by: RADIOLOGY

## 2024-05-18 PROCEDURE — 2580000003 HC RX 258: Performed by: INTERNAL MEDICINE

## 2024-05-18 PROCEDURE — 83735 ASSAY OF MAGNESIUM: CPT

## 2024-05-18 PROCEDURE — 85025 COMPLETE CBC W/AUTO DIFF WBC: CPT

## 2024-05-18 PROCEDURE — C9113 INJ PANTOPRAZOLE SODIUM, VIA: HCPCS | Performed by: INTERNAL MEDICINE

## 2024-05-18 PROCEDURE — 6360000002 HC RX W HCPCS: Performed by: INTERNAL MEDICINE

## 2024-05-18 PROCEDURE — 85730 THROMBOPLASTIN TIME PARTIAL: CPT

## 2024-05-18 PROCEDURE — A4216 STERILE WATER/SALINE, 10 ML: HCPCS | Performed by: INTERNAL MEDICINE

## 2024-05-18 PROCEDURE — 80053 COMPREHEN METABOLIC PANEL: CPT

## 2024-05-18 PROCEDURE — 70553 MRI BRAIN STEM W/O & W/DYE: CPT

## 2024-05-18 PROCEDURE — 99232 SBSQ HOSP IP/OBS MODERATE 35: CPT | Performed by: STUDENT IN AN ORGANIZED HEALTH CARE EDUCATION/TRAINING PROGRAM

## 2024-05-18 PROCEDURE — 99232 SBSQ HOSP IP/OBS MODERATE 35: CPT

## 2024-05-18 PROCEDURE — 84100 ASSAY OF PHOSPHORUS: CPT

## 2024-05-18 PROCEDURE — 85610 PROTHROMBIN TIME: CPT

## 2024-05-18 PROCEDURE — 6360000004 HC RX CONTRAST MEDICATION: Performed by: RADIOLOGY

## 2024-05-18 PROCEDURE — 2060000000 HC ICU INTERMEDIATE R&B

## 2024-05-18 RX ADMIN — PANTOPRAZOLE SODIUM 40 MG: 40 INJECTION, POWDER, FOR SOLUTION INTRAVENOUS at 08:55

## 2024-05-18 RX ADMIN — GADOBENATE DIMEGLUMINE 10 ML: 529 INJECTION, SOLUTION INTRAVENOUS at 15:14

## 2024-05-18 RX ADMIN — LEVETIRACETAM 1000 MG: 100 INJECTION, SOLUTION INTRAVENOUS at 12:25

## 2024-05-18 RX ADMIN — LEVETIRACETAM 1000 MG: 100 INJECTION, SOLUTION INTRAVENOUS at 22:28

## 2024-05-18 RX ADMIN — SODIUM CHLORIDE, POTASSIUM CHLORIDE, SODIUM LACTATE AND CALCIUM CHLORIDE: 600; 310; 30; 20 INJECTION, SOLUTION INTRAVENOUS at 05:53

## 2024-05-18 RX ADMIN — HEPARIN SODIUM 5000 UNITS: 5000 INJECTION INTRAVENOUS; SUBCUTANEOUS at 05:52

## 2024-05-18 NOTE — PLAN OF CARE
Problem: Skin/Tissue Integrity  Goal: Absence of new skin breakdown  Description: 1.  Monitor for areas of redness and/or skin breakdown  2.  Assess vascular access sites hourly  3.  Every 4-6 hours minimum:  Change oxygen saturation probe site  4.  Every 4-6 hours:  If on nasal continuous positive airway pressure, respiratory therapy assess nares and determine need for appliance change or resting period.  Outcome: Progressing     Problem: Safety - Adult  Goal: Free from fall injury  Outcome: Progressing     Problem: ABCDS Injury Assessment  Goal: Absence of physical injury  Outcome: Progressing     Problem: Confusion  Goal: Confusion, delirium, dementia, or psychosis is improved or at baseline  Description: INTERVENTIONS:  1. Assess for possible contributors to thought disturbance, including medications, impaired vision or hearing, underlying metabolic abnormalities, dehydration, psychiatric diagnoses, and notify attending LIP  2. [place] high risk fall precautions, as indicated  3. Provide frequent short contacts to provide reality reorientation, refocusing and direction  4. Decrease environmental stimuli, including noise as appropriate  5. Monitor and intervene to maintain adequate nutrition, hydration, elimination, sleep and activity  6. If unable to ensure safety without constant attention obtain sitter and review sitter guidelines with assigned personnel  7. Initiate Psychosocial CNS and Spiritual Care consult, as indicated  Outcome: Progressing     Problem: Pain  Goal: Verbalizes/displays adequate comfort level or baseline comfort level  Outcome: Progressing

## 2024-05-19 PROCEDURE — 99239 HOSP IP/OBS DSCHRG MGMT >30: CPT | Performed by: STUDENT IN AN ORGANIZED HEALTH CARE EDUCATION/TRAINING PROGRAM

## 2024-05-19 PROCEDURE — 2060000000 HC ICU INTERMEDIATE R&B

## 2024-05-19 ASSESSMENT — PAIN SCALES - GENERAL
PAINLEVEL_OUTOF10: 0
PAINLEVEL_OUTOF10: 0

## 2024-05-19 NOTE — PLAN OF CARE
Problem: Discharge Planning  Goal: Discharge to home or other facility with appropriate resources  Outcome: Progressing     Problem: Skin/Tissue Integrity  Goal: Absence of new skin breakdown  Description: 1.  Monitor for areas of redness and/or skin breakdown  2.  Assess vascular access sites hourly  3.  Every 4-6 hours minimum:  Change oxygen saturation probe site  4.  Every 4-6 hours:  If on nasal continuous positive airway pressure, respiratory therapy assess nares and determine need for appliance change or resting period.  Outcome: Progressing     Problem: Safety - Adult  Goal: Free from fall injury  Outcome: Progressing     Problem: ABCDS Injury Assessment  Goal: Absence of physical injury  Outcome: Progressing     Problem: Confusion  Goal: Confusion, delirium, dementia, or psychosis is improved or at baseline  Description: INTERVENTIONS:  1. Assess for possible contributors to thought disturbance, including medications, impaired vision or hearing, underlying metabolic abnormalities, dehydration, psychiatric diagnoses, and notify attending LIP  2. [place] high risk fall precautions, as indicated  3. Provide frequent short contacts to provide reality reorientation, refocusing and direction  4. Decrease environmental stimuli, including noise as appropriate  5. Monitor and intervene to maintain adequate nutrition, hydration, elimination, sleep and activity  6. If unable to ensure safety without constant attention obtain sitter and review sitter guidelines with assigned personnel  7. Initiate Psychosocial CNS and Spiritual Care consult, as indicated  Outcome: Progressing     Problem: Risk for Elopement  Goal: Patient will not exit the unit/facility without proper excort  Outcome: Progressing     Problem: Pain  Goal: Verbalizes/displays adequate comfort level or baseline comfort level  Outcome: Progressing

## 2024-05-19 NOTE — PLAN OF CARE
Problem: Discharge Planning  Goal: Discharge to home or other facility with appropriate resources  Outcome: Progressing     Problem: Skin/Tissue Integrity  Goal: Absence of new skin breakdown  Description: 1.  Monitor for areas of redness and/or skin breakdown  2.  Assess vascular access sites hourly  3.  Every 4-6 hours minimum:  Change oxygen saturation probe site  4.  Every 4-6 hours:  If on nasal continuous positive airway pressure, respiratory therapy assess nares and determine need for appliance change or resting period.  5/18/2024 2105 by Nat Ramos RN  Outcome: Progressing     Problem: Safety - Adult  Goal: Free from fall injury  5/18/2024 2105 by Nat Ramos RN  Outcome: Progressing     Problem: ABCDS Injury Assessment  Goal: Absence of physical injury  5/18/2024 2105 by Nat Ramos RN  Outcome: Progressing     Problem: Confusion  Goal: Confusion, delirium, dementia, or psychosis is improved or at baseline  Description: INTERVENTIONS:  1. Assess for possible contributors to thought disturbance, including medications, impaired vision or hearing, underlying metabolic abnormalities, dehydration, psychiatric diagnoses, and notify attending LIP  2. [place] high risk fall precautions, as indicated  3. Provide frequent short contacts to provide reality reorientation, refocusing and direction  4. Decrease environmental stimuli, including noise as appropriate  5. Monitor and intervene to maintain adequate nutrition, hydration, elimination, sleep and activity  6. If unable to ensure safety without constant attention obtain sitter and review sitter guidelines with assigned personnel  7. Initiate Psychosocial CNS and Spiritual Care consult, as indicated  5/18/2024 2105 by Nat Ramos RN  Outcome: Progressing     Problem: Pain  Goal: Verbalizes/displays adequate comfort level or baseline comfort level  5/18/2024 2105 by Nat Ramos RN  Outcome: Progressing

## 2024-05-19 NOTE — PLAN OF CARE
Neurology plan of care note    Patient presented to [facility] on 5/16/2024 after intentional Wellbutrin overdose.  He was found by family with an empty bottle of Wellbutrin 150 mg XL which was full previously.  During transportation from [facility] to [facility] he had a seizure which lasted approximately 20 seconds.  Upon arrival to the MICU patient had a generalized tonic-clonic seizure which lasted approximately 30 to 40 seconds.  He immediately regained consciousness afterwards and began following commands within 5 minutes.     Poison control was contacted and patient needs to be watched for possible seizures or QT prolongation or widening of the QRS.     CT head was negative for acute abnormalities     EEG negative for seizure    MRI negative for seizure activity    Plan  Continue Keppra for now, can be weaned as outpatient  If he has a breakthrough seizure can be switched to Onfi or Klonopin  Neurology will sign off call if needed  Continue seizure precautions for 6 months from date of last seizure. These include, but are not limited to:   No driving  No riding bicycles in traffic  No operating dangerous/heavy machinery  No engaging in activities at dangerous heights including using ladders  No taking baths unattended  No swimming  No engaging in activities near fire unattended  Recommend caution or avoidance of cooking or using potentially dangerous objects such as knives.  Avoid any activities where sudden loss of consciousness could result in injury to yourself or others

## 2024-05-19 NOTE — CARE COORDINATION
Chart reviewed and discharge orders noted.  Patient medically cleared to transition to psych today.  Patient is pink slipped.  Per Section G, no beds on psych unit today, will transition when bed available.  CM/ANTONIO will continue to follow.       Evangelina Manning RN.  P:  [phone]

## 2024-05-20 ENCOUNTER — HOSPITAL ENCOUNTER (INPATIENT)
Age: 24
LOS: 1 days | Discharge: HOME | DRG: 882 | End: 2024-05-21
Attending: PSYCHIATRY & NEUROLOGY | Admitting: PSYCHIATRY & NEUROLOGY
Payer: COMMERCIAL

## 2024-05-20 VITALS
RESPIRATION RATE: 17 BRPM | TEMPERATURE: 97.8 F | HEART RATE: 56 BPM | SYSTOLIC BLOOD PRESSURE: 115 MMHG | DIASTOLIC BLOOD PRESSURE: 80 MMHG | OXYGEN SATURATION: 99 % | WEIGHT: 119.49 LBS

## 2024-05-20 PROBLEM — F32.9 MAJOR DEPRESSION, SINGLE EPISODE: Status: ACTIVE | Noted: 2024-05-20

## 2024-05-20 PROCEDURE — 99232 SBSQ HOSP IP/OBS MODERATE 35: CPT | Performed by: STUDENT IN AN ORGANIZED HEALTH CARE EDUCATION/TRAINING PROGRAM

## 2024-05-20 PROCEDURE — 1240000000 HC EMOTIONAL WELLNESS R&B

## 2024-05-20 PROCEDURE — 6370000000 HC RX 637 (ALT 250 FOR IP): Performed by: PSYCHIATRY & NEUROLOGY

## 2024-05-20 RX ORDER — HYDROXYZINE PAMOATE 25 MG/1
50 CAPSULE ORAL 3 TIMES DAILY PRN
Status: DISCONTINUED | OUTPATIENT
Start: 2024-05-20 | End: 2024-05-21 | Stop reason: HOSPADM

## 2024-05-20 RX ORDER — ACETAMINOPHEN 325 MG/1
650 TABLET ORAL EVERY 6 HOURS PRN
Status: DISCONTINUED | OUTPATIENT
Start: 2024-05-20 | End: 2024-05-21 | Stop reason: HOSPADM

## 2024-05-20 RX ORDER — LANOLIN ALCOHOL/MO/W.PET/CERES
3 CREAM (GRAM) TOPICAL NIGHTLY PRN
Status: DISCONTINUED | OUTPATIENT
Start: 2024-05-20 | End: 2024-05-21 | Stop reason: HOSPADM

## 2024-05-20 RX ORDER — HALOPERIDOL 5 MG/ML
5 INJECTION INTRAMUSCULAR EVERY 6 HOURS PRN
Status: DISCONTINUED | OUTPATIENT
Start: 2024-05-20 | End: 2024-05-21 | Stop reason: HOSPADM

## 2024-05-20 RX ORDER — HALOPERIDOL 5 MG/1
5 TABLET ORAL EVERY 6 HOURS PRN
Status: DISCONTINUED | OUTPATIENT
Start: 2024-05-20 | End: 2024-05-21 | Stop reason: HOSPADM

## 2024-05-20 RX ORDER — NICOTINE 21 MG/24HR
1 PATCH, TRANSDERMAL 24 HOURS TRANSDERMAL DAILY
Status: DISCONTINUED | OUTPATIENT
Start: 2024-05-20 | End: 2024-05-21 | Stop reason: HOSPADM

## 2024-05-20 RX ORDER — MAGNESIUM HYDROXIDE/ALUMINUM HYDROXICE/SIMETHICONE 120; 1200; 1200 MG/30ML; MG/30ML; MG/30ML
30 SUSPENSION ORAL PRN
Status: DISCONTINUED | OUTPATIENT
Start: 2024-05-20 | End: 2024-05-21 | Stop reason: HOSPADM

## 2024-05-20 ASSESSMENT — PATIENT HEALTH QUESTIONNAIRE - PHQ9
SUM OF ALL RESPONSES TO PHQ QUESTIONS 1-9: 12
3. TROUBLE FALLING OR STAYING ASLEEP: SEVERAL DAYS
SUM OF ALL RESPONSES TO PHQ QUESTIONS 1-9: 11
5. POOR APPETITE OR OVEREATING: SEVERAL DAYS
4. FEELING TIRED OR HAVING LITTLE ENERGY: MORE THAN HALF THE DAYS
9. THOUGHTS THAT YOU WOULD BE BETTER OFF DEAD, OR OF HURTING YOURSELF: SEVERAL DAYS
2. FEELING DOWN, DEPRESSED OR HOPELESS: MORE THAN HALF THE DAYS
8. MOVING OR SPEAKING SO SLOWLY THAT OTHER PEOPLE COULD HAVE NOTICED. OR THE OPPOSITE, BEING SO FIGETY OR RESTLESS THAT YOU HAVE BEEN MOVING AROUND A LOT MORE THAN USUAL: SEVERAL DAYS
SUM OF ALL RESPONSES TO PHQ QUESTIONS 1-9: 12
1. LITTLE INTEREST OR PLEASURE IN DOING THINGS: SEVERAL DAYS
7. TROUBLE CONCENTRATING ON THINGS, SUCH AS READING THE NEWSPAPER OR WATCHING TELEVISION: MORE THAN HALF THE DAYS
SUM OF ALL RESPONSES TO PHQ QUESTIONS 1-9: 12
6. FEELING BAD ABOUT YOURSELF - OR THAT YOU ARE A FAILURE OR HAVE LET YOURSELF OR YOUR FAMILY DOWN: SEVERAL DAYS
SUM OF ALL RESPONSES TO PHQ9 QUESTIONS 1 & 2: 3
10. IF YOU CHECKED OFF ANY PROBLEMS, HOW DIFFICULT HAVE THESE PROBLEMS MADE IT FOR YOU TO DO YOUR WORK, TAKE CARE OF THINGS AT HOME, OR GET ALONG WITH OTHER PEOPLE: VERY DIFFICULT

## 2024-05-20 ASSESSMENT — SLEEP AND FATIGUE QUESTIONNAIRES
DO YOU USE A SLEEP AID: NO
AVERAGE NUMBER OF SLEEP HOURS: 6
DO YOU HAVE DIFFICULTY SLEEPING: NO

## 2024-05-20 ASSESSMENT — PAIN SCALES - GENERAL: PAINLEVEL_OUTOF10: 0

## 2024-05-20 ASSESSMENT — LIFESTYLE VARIABLES
HOW MANY STANDARD DRINKS CONTAINING ALCOHOL DO YOU HAVE ON A TYPICAL DAY: 5 OR 6
HOW OFTEN DO YOU HAVE A DRINK CONTAINING ALCOHOL: MONTHLY OR LESS

## 2024-05-20 NOTE — DISCHARGE SUMMARY
Hospital Medicine Discharge Summary    Patient ID: Ho Meade      Patient's PCP: No primary care provider on file.    Admit Date: 5/16/2024     Discharge Date:   05/20/2024    Admitting Physician: Nithin Keenan MD     Discharge Physician: Renzo Downing MD     Discharge Diagnoses:  Intentional drug overdose/Wellbutrin overdose      Active Hospital Problems    Diagnosis Date Noted    Provoked seizure (HCC) [R56.9] 05/17/2024    Intentional overdose (HCC) [T50.902A] 05/16/2024       The patient was seen and examined on day of discharge and this discharge summary is in conjunction with any daily progress note from day of discharge.    Hospital Course:   Mr. Ho Meade, a 23 y.o. year old male  who  has no past medical history on file.  Patient presented to the [facility] emergency department due to intentional overdose on Wellbutrin.  He reportedly took 20 pills of Wellbutrin last night.  Patient was stable in the emergency department decision was made to transfer patient to ICU for monitoring.  However, on the way to [facility], he was noted to have seizure-like activity lasting for about 20 seconds.  He again had a witnessed generalized tonic-clonic seizure lasting about 40 seconds on arrival to the ICU. He was loaded with Keppra and stat EEG and neurology consult ordered.      He was admitted to intensive care unit for 2 days.        Neurology recommended to continue Keppra, MRI brain was ordered which was negative.  Keppra was discontinued.  Continue seizure precautions for 6 months from date of last seizure. These include, but are not limited to:   No driving  No riding bicycles in traffic  No operating dangerous/heavy machinery  No engaging in activities at dangerous heights including using ladders  No taking baths unattended  No swimming  No engaging in activities near fire unattended  Recommend caution or avoidance of cooking or using potentially dangerous objects such as 
knives.  Avoid any activities where sudden loss of consciousness could result in injury to yourself or others    EEG reviewed showed nonspecific encephalopathy no evidence of seizure   Wellbutrin has shown to decrease seizure threshold.    Patient stated marked improvement in symptoms, patient is being discharged to psychiatry floor for further treatment of his depression and suicidal attempt      Patient is medically cleared to be discharged to psychiatry unit    Exam:     /78   Pulse 52   Temp 97.7 °F (36.5 °C) (Temporal)   Resp 16   Wt 54.2 kg (119 lb 7.8 oz)   SpO2 100%     General appearance: No apparent distress, appears stated age and cooperative.  HEENT: Pupils equal, round, and reactive to light. Conjunctivae/corneas clear.  Neck: Supple, with full range of motion. No jugular venous distention. Trachea midline.  Respiratory:  Normal respiratory effort. Clear to auscultation, bilaterally without Rales/Wheezes/Rhonchi.  Cardiovascular: Regular rate and rhythm with normal S1/S2 without murmurs, rubs or gallops.  Abdomen: Soft, non-tender, non-distended with normal bowel sounds.  Musculoskeletal: No clubbing, cyanosis or edema bilaterally.  Full range of motion without deformity.  Skin: Skin color, texture, turgor normal.  No rashes or lesions.  Neurologic:  Neurovascularly intact without any focal sensory/motor deficits. Cranial nerves: II-XII intact, grossly non-focal.  Psychiatric: Alert and oriented, thought content appropriate, normal insight      Consults:     IP CONSULT TO SOCIAL WORK  IP CONSULT TO NEUROLOGY  IP CONSULT TO PSYCHIATRY    Significant Diagnostic Studies:   MRI brain, EEG    Disposition: Inpatient psychiatry    Discharge Instructions/Follow-up: Home    Code Status:  Full Code as documented    Activity: activity as tolerated    Condition: Stable at the time of discharge    Diet: Regular    Labs: For convenience and continuity at follow-up the following most recent labs are
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
## 2024-05-20 NOTE — BH NOTE
4 Eyes Skin Assessment     NAME:  Ho Meade  YOB: 2000  MEDICAL RECORD NUMBER:  [id number]    The patient is being assessed for  Admission    I agree that at least one RN has performed a thorough Head to Toe Skin Assessment on the patient. ALL assessment sites listed below have been assessed.      Areas assessed by both nurses:    Head, Face, Ears, Shoulders, Back, Chest, and Arms, Elbows, Hands        Does the Patient have a Wound: Right forearm bandage where IV was taken out. Negative complications.       Paul Prevention initiated by RN: No  Wound Care Orders initiated by RN: No    Pressure Injury (Stage 3,4, Unstageable, DTI, NWPT, and Complex wounds) if present, place Wound referral order by RN under : No    New Ostomies, if present place, Ostomy referral order under : No     Nurse 1 eSignature: Electronically signed by Ammon Wilkerson RN on 5/20/24 at 7:37 PM EDT    **SHARE this note so that the co-signing nurse can place an eSignature**    Nurse 2 eSignature: Electronically signed by Kurtis Austin RN on 5/20/24 at 8:23 PM EDT

## 2024-05-20 NOTE — PLAN OF CARE
Problem: Discharge Planning  Goal: Discharge to home or other facility with appropriate resources  5/19/2024 2110 by Adeline Mcnair RN  Outcome: Progressing  5/19/2024 1112 by Sydnie Cameron RN  Outcome: Progressing     Problem: Skin/Tissue Integrity  Goal: Absence of new skin breakdown  Description: 1.  Monitor for areas of redness and/or skin breakdown  2.  Assess vascular access sites hourly  3.  Every 4-6 hours minimum:  Change oxygen saturation probe site  4.  Every 4-6 hours:  If on nasal continuous positive airway pressure, respiratory therapy assess nares and determine need for appliance change or resting period.  5/19/2024 2110 by Adeline Mcnair RN  Outcome: Progressing  5/19/2024 1112 by Sydnie Cameron RN  Outcome: Progressing     Problem: Safety - Adult  Goal: Free from fall injury  5/19/2024 2110 by Adeline Mcnair RN  Outcome: Progressing  5/19/2024 1112 by Sydnie Cameron RN  Outcome: Progressing     Problem: ABCDS Injury Assessment  Goal: Absence of physical injury  5/19/2024 2110 by Adeline Mcnair RN  Outcome: Progressing  5/19/2024 1112 by Sydnie Cameron RN  Outcome: Progressing     Problem: Confusion  Goal: Confusion, delirium, dementia, or psychosis is improved or at baseline  Description: INTERVENTIONS:  1. Assess for possible contributors to thought disturbance, including medications, impaired vision or hearing, underlying metabolic abnormalities, dehydration, psychiatric diagnoses, and notify attending LIP  2. Coden high risk fall precautions, as indicated  3. Provide frequent short contacts to provide reality reorientation, refocusing and direction  4. Decrease environmental stimuli, including noise as appropriate  5. Monitor and intervene to maintain adequate nutrition, hydration, elimination, sleep and activity  6. If unable to ensure safety without constant attention obtain sitter and review sitter guidelines with assigned personnel  7. Initiate

## 2024-05-20 NOTE — PLAN OF CARE
Problem: Discharge Planning  Goal: Discharge to home or other facility with appropriate resources  Outcome: Progressing     Problem: Skin/Tissue Integrity  Goal: Absence of new skin breakdown  Description: 1.  Monitor for areas of redness and/or skin breakdown  2.  Assess vascular access sites hourly  3.  Every 4-6 hours minimum:  Change oxygen saturation probe site  4.  Every 4-6 hours:  If on nasal continuous positive airway pressure, respiratory therapy assess nares and determine need for appliance change or resting period.  Outcome: Progressing     Problem: Safety - Adult  Goal: Free from fall injury  Outcome: Progressing     Problem: Self Harm/Suicidality  Goal: Will have no self-injury during hospital stay  Description: INTERVENTIONS:  1.  Ensure constant observer at bedside with Q15M safety checks  2.  Maintain a safe environment  3.  Secure patient belongings  4.  Ensure family/visitors adhere to safety recommendations  5.  Ensure safety tray has been added to patient's diet order  6.  Every shift and PRN: Re-assess suicidal risk via Frequent Screener    Outcome: Progressing

## 2024-05-20 NOTE — ED NOTES
Call from Nena on [facility], states family is requesting pt be discharged to their care, asking about this possibility.  Pt is on pink-slip, psych consult on 5/17/2024.  Explained pt's physician can overturn the involuntary hold, suggested physician discuss with Jesica Canchola [phone] who completed psych consult.     Call to Jesica Canchola CNP to inform of above.    
Nurse to nurse called to Carolyn Joya  
PT ACCEPTED TO [facility]    ASSIGNED [id number] TO ESTIVEN IN ADMITTING     UPDATED BRENDA - PINK SLIP DATED 5/16/24  
RECEIVED CALL FROM FROM FLOOR NURSE ADVISING THAT PT IS MEDICALLY CLEARED, BUT THE LAST NOTATION FROM DR. KRISHNAMURTHY IS Patient will need inpatient psychiatry admission once cleared by neurology and intensive care unit     AWAITING PINK SLIP TO BE FAXED TO [phone]    PT HAS NO LISTED INSURANCE - [facility] CONTACTED AND APPROVED BED DAYS AT THIS FACILITY.    PLEASE CALL [phone] TO PROCEED WITH PSYCH ADMISSION ONCE MEDICALLY CLEARED.      
RN CAN CALL N2N TO [phone] (floor  [facility])    AWAITING CALL BACK FROM [facility] RN ON [facility]S TO PROCEED WITH ADMISSION  
Spoke to Mariposa at [facility] and transport would arrive roughly around 60 mins  
Unit G SW is aware that pt needs admission    We have received the pink slip    Medical clearance noted    There are no bed on the psych unit at this time - we can proceed with admission once a bed is available  
Spoke with the intensivist at [facility], discussed case, patient is accepted to the medical ICU. [VG]   0739 Spoke with Dr. Lisa Witt, discussed case, patient is accepted for transfer/admission to [facility].    [VG]   0744 Access center does note that the ICU is discharge dependent currently. [VG]   0803 EKG reviewed and interpreted by me, 0759:    Sinus tachycardia, normal axis, no acute ischemic changes, rate 109, QRS 88, QTc 465; no significant changes compared to previous EKG obtained at 6 AM during this encounter. [VG]   0824 Patient has a bed assigned at [facility] [VG]      ED Course User Index  [VG] Renee Cameron DO     Labs Reviewed   SERUM DRUG SCREEN - Abnormal; Notable for the following components:       Result Value    Acetaminophen Level <5 (*)     Ethanol Lvl 43 (*)     All other components within normal limits   CBC WITH AUTO DIFFERENTIAL - Abnormal; Notable for the following components:    MCHC 34.7 (*)     All other components within normal limits   COMPREHENSIVE METABOLIC PANEL - Abnormal; Notable for the following components:    Potassium 3.2 (*)     Glucose 103 (*)     All other components within normal limits   CK - Abnormal; Notable for the following components:    Total  (*)     All other components within normal limits   POCT BLOOD GASES - Abnormal; Notable for the following components:    POC PO2 104.7 (*)     POC HCO3 26.1 (*)     All other components within normal limits   MAGNESIUM   URINALYSIS   URINE DRUG SCREEN   T4, FREE   TSH   BLOOD GAS, ARTERIAL     CT HEAD WO CONTRAST   Final Result   No acute intracranial abnormality.         XR CHEST PORTABLE   Final Result   No acute cardiopulmonary process.               Renee Cameron DO  Emergency Medicine  8:48 AM       Renee Cameron DO  05/16/24 0849

## 2024-05-20 NOTE — PROGRESS NOTES
[facility]             Pulmonary & Critical Care Medicine                MICU Progress Note                 Written by: Qamar Barbosa MD  Name: Ho Meade : 2000       Age: 23 y.o. MR/Act #    : [id number],  Billing  #    : [id number]   Admit Date: 2024  5:31 AM LOS: 1,   Hospital Day: 2 Room #      : 4407/4407-A   PCP            : No primary care provider on file.,   Referred by: Renzo Downing MD ICU Attending: MD Neptali Pereira date: 2024 10:49 AM   ICU Days:     2 Vent Days:   0 LOS: 1,                          Reason for ICU admission           Chief Complaint   Patient presents with    Drug Overdose     Pt states he was trying to end his life and took a whole bottle of medications                           Brief HPI, Presentation & Synopsis                       23-year-old male with past medical history of dysthymia, suspected MDD presented with Wellbutrin overdose. He was with his girlfriend last night when he was reportedly taken 20 pills of Wellbutrin XL. He was taken to the [place] ER where he was managed for overdose. On the way from [place] down to [facility] he was noted to have seizure-like activity lasting for 20 seconds. On arrival to MICU he was noted to have generalized tonic-clonic seizure lasting about 30 to 40 seconds with immediate regaining of consciousness and started following commands within 5 minutes. Admitted in MICU for Wellbutrin overdose.     Active problem list of yesterday:  Active Hospital Problems    Diagnosis Date Noted    Intentional overdose (HCC) [T50.902A] 2024                           Events of last night                      No further episodes of seizures                      Subjective   2024               Hemodynamically stable                      Objective  2024               Vitals:    24 1000   BP: 116/64   Pulse: 78   Resp: 20   Temp:    SpO2: 94%     Temperature range : 
    Hospitalist Progress Note      SYNOPSIS: Patient admitted on 2024 for Intentional overdose (HCC)  Mr. Ho Meade, a 23 y.o. year old male  who  has no past medical history on file.  Patient presented to the [place] emergency department due to intentional overdose on Wellbutrin.  He reportedly took 20 pills of Wellbutrin last night.  Patient was stable in the emergency department decision was made to transfer patient to ICU for monitoring.  However, on the way to [facility], he was noted to have seizure-like activity lasting for about 20 seconds.  He again had a witnessed generalized tonic-clonic seizure lasting about 40 seconds on arrival to the ICU. He was loaded with Keppra and stat EEG and neurology consult ordered.   Neurology recommended to continue Keppra, MRI brain has been ordered, EEG    SUBJECTIVE:  Stable overnight. No other overnight issues reported.   Patient seen and examined at bedside today a.m. patient denies any other complain  Patient has no episode of seizure  Discussed with mother present at bedside  Records reviewed.         Temp (24hrs), Av.1 °F (36.7 °C), Min:97.5 °F (36.4 °C), Max:98.5 °F (36.9 °C)    DIET: ADULT DIET; Regular  CODE: Full Code    Intake/Output Summary (Last 24 hours) at 2024 1023  Last data filed at 2024 0855  Gross per 24 hour   Intake 2694 ml   Output 1925 ml   Net 769 ml       Review of Systems  All bolded are positive; please see HPI  General:  Fever, chills, diaphoresis, fatigue, malaise, night sweats, weight loss  Psychological:  Anxiety, disorientation, hallucinations.  ENT:  Epistaxis, headaches, vertigo, visual changes.  Cardiovascular:  Chest pain, irregular heartbeats, palpitations, paroxysmal nocturnal dyspnea.  Respiratory:  Shortness of breath, coughing, sputum production, hemoptysis, wheezing, orthopnea.  Gastrointestinal:  Nausea, vomiting, diarrhea, heartburn, constipation, abdominal pain, hematemesis, 
  Physician Progress Note      PATIENT:               ALBERTO MANN  CSN #:                  [id number]  :                       2000  ADMIT DATE:       2024 5:31 AM  DISCH DATE:  RESPONDING  PROVIDER #:        Renzo Downing MD          QUERY TEXT:    Pt admitted with intentional overdose and has encephalopathy documented. If   possible, please document in progress notes and discharge summary further   specificity regarding the type of encephalopathy:    The medical record reflects the following:  Risk Factors: Intentional overdose  Clinical Indicators: per ED notes very slow to respond; per Neurology consult   EEG done today without any evidence of epileptiform discharges Since transfer   he has also become more somnolent and has reportedly been hallucinating.    progress note  EEG reviewed showed nonspecific encephalopathy no evidence   of seizure  Treatment: EEG, Neurology consult, IVF, continued monitoring in the ICU    Thank you  Margaret ADEN, RN, CCDS  Clinical Documentation Improvement  Options provided:  -- Toxic encephalopathy  -- Drug-induced encephalopathy due to Wellbutrin overdose  -- Toxic metabolic encephalopathy  -- Other - I will add my own diagnosis  -- Disagree - Not applicable / Not valid  -- Disagree - Clinically unable to determine / Unknown  -- Refer to Clinical Documentation Reviewer    PROVIDER RESPONSE TEXT:    This patient has drug-induced encephalopathy due to Wellbutrin overdose.    Query created by: Margaret Pretty on 2024 9:17 AM      Electronically signed by:  Renzo Downing MD 2024 9:22 AM          
 Ho Meade is a 23 y.o. right handed male     Neurology following for provoked seizures    PMH of no significant health history      Assessment:     Provoked seizures secondary to Wellbutrin overdose  Found by family with an empty bottle of Wellbutrin 150 mg XL  And 1 seizure on the way to [facility]  Had a second seizure while being admitted to MICU  EEG negative for seizure activity  MRI brain pending  Poison control said to look out for seizure activity    Plan:     MRI brain  Continue Keppra for now, can be weaned as outpatient  If he has a breakthrough seizure can be switched to Onfi or Klonopin  Neurology will follow, however will sign off if MRI brain negative  Continue seizure precautions for 6 months from date of last seizure. These include, but are not limited to:   No driving  No riding bicycles in traffic  No operating dangerous/heavy machinery  No engaging in activities at dangerous heights including using ladders  No taking baths unattended  No swimming  No engaging in activities near fire unattended  Recommend caution or avoidance of cooking or using potentially dangerous objects such as knives.  Avoid any activities where sudden loss of consciousness could result in injury to yourself or others    History of Present Illness:     Patient presented to [facility] on 5/16/2024 after intentional Wellbutrin overdose.  He was found by family with an empty bottle of Wellbutrin 150 mg XL which was full previously.  During transportation from [facility] to [facility] he had a seizure which lasted approximately 20 seconds.  Upon arrival to the MICU patient had a generalized tonic-clonic seizure which lasted approximately 30 to 40 seconds.  He immediately regained consciousness afterwards and began following commands within 5 minutes.    Poison control was contacted and patient needs to be watched for possible seizures or QT prolongation or widening of the QRS.    CT head was 
4 Eyes Skin Assessment     NAME:  Ho Meade  YOB: 2000  MEDICAL RECORD NUMBER:  [id number]    The patient is being assessed for  Admission    I agree that at least one RN has performed a thorough Head to Toe Skin Assessment on the patient. ALL assessment sites listed below have been assessed.      Areas assessed by both nurses:    Head, Face, Ears, Shoulders, Back, Chest, Arms, Elbows, Hands, Sacrum. Buttock, Coccyx, Ischium, Legs. Feet and Heels, and Under Medical Devices         Does the Patient have a Wound? No noted wound(s)       Paul Prevention initiated by RN: Yes  Wound Care Orders initiated by RN: No    Pressure Injury (Stage 3,4, Unstageable, DTI, NWPT, and Complex wounds) if present, place Wound referral order by RN under : No    New Ostomies, if present place, Ostomy referral order under : No     Nurse 1 eSignature: Electronically signed by Carolyn Boyle on 5/16/24 at 2:45 PM EDT    **SHARE this note so that the co-signing nurse can place an eSignature**    Nurse 2 eSignature: Electronically signed by Praveena Duran RN on 5/16/24 at 10:52 AM EDT  
EEG completed. Report to follow.  Purnima Herndon 5/16/2024     
Message sent to physician for clarification, patient is medically cleared for admission to [facility], and all medications ordered can be discontinued.  
Messaged Dr Downing via perfect serve to notify patient and family are requesting patient to be discharge home with an established home safety plan     Called and spoke to Susan Canchola NP and they are NOT willing to vacate the pink slip.     Patient will discharge to psychiatric floor tomorrow 5/20      Electronically signed by Nena Rodriguez RN on 5/19/2024 at 4:42 PM     
Messaged Dr Downing via perfect serve to request medical clearance, neuro ok for discharge, per Dr Downing patient can discharge to psychiatric unit and is medically clear       Electronically signed by Nena Rodriguez RN on 5/19/2024 at 8:11 AM        
Messaged Jeaneth Sultana NP to see if patient is clear from neuro for discharge to psych    Electronically signed by Nena Rodriguez RN on 5/19/2024 at 8:06 AM   
Nurse to nurse report call to Behavioral health spoke with TACHO. Waiting conformation for admission to psychiatric serviced inpatient. Mom is in the room with patient updated.  
PT. ACCEPTED BY ROSE MARTE NP, FOR DR. RADHA LAYNE MD TO [id number].  
Patient given release of information to sign for parents to obtain information on his behalf. IV removed and patient belongings gathered and given to his mom. Patient discharged to psych services room # [id number]  
Received call from [facility]  and there is no bed available at this time  
for seizure    Subjective:     Patient sitting up in bed with family at the bedside.  He is awake and alert and oriented x 3.  He is able to follow all commands without difficulty, he is slow to respond verbally.  There has been no seizure activity since his admission    He is awaiting his MRI brain    Objective:       /64   Pulse 78   Temp 98.2 °F (36.8 °C) (Oral)   Resp 20   Wt 52.6 kg (115 lb 15.4 oz)   SpO2 94%       General appearance: alert, appears stated age, cooperative and no distress  Head: normocephalic, without obvious abnormality, atraumatic  Eyes: conjunctivae/corneas clear  Neck: no adenopathy,  supple, symmetrical, trachea midline and thyroid not enlarged, symmetric, no tenderness/mass/nodules  Lungs: Regular respirations on room air  Heart: No chest pain or palpitations  Abdomen: soft, non-tender; bowel sounds normal; no masses,  no organomegaly  Extremities:  normal, atraumatic, no cyanosis or edema  Pulses: 2+ and symmetric  Skin: color, texture, turgor normal---no rashes or lesions      Mental Status: Alert, oriented, thought content appropriate, alertness: alert, orientation: time, date, person, place, affect: normal and flat, thought content exhibits logical connections     Appropriate attention/concentration  Intact fundus of knowledge  Intact memories      Speech: Clear  Language: Delayed responses    Cranial Nerves:  I: smell    II: visual acuity     II: visual fields Full to confrontation   II: pupils RL   III,VII: ptosis None   III,IV,VI: extraocular muscles  Full ROM   V: mastication    V: facial light touch sensation  Normal   V,VII: corneal reflex     VII: facial muscle function - upper  Normal   VII: facial muscle function - lower Normal   VIII: hearing Normal   IX: soft palate elevation     IX,X: gag reflex    XI: trapezius strength  5/5   XI: sternocleidomastoid strength    XI: neck extension strength     XII: tongue strength  Normal     Motor:  5/5 throughout  Normal 
Low, [] Moderate,[]  High  Patient's risk as above due to        Medications:  REVIEWED DAILY    Infusion Medications   Scheduled Medications    potassium chloride  20 mEq Oral Once     PRN Meds: ondansetron    Labs:     Recent Labs     05/18/24 0545   WBC 9.1   HGB 13.5   HCT 40.6          Recent Labs     05/18/24 0545      K 3.9      CO2 21*   BUN 6   CREATININE 0.8   CALCIUM 8.8   PHOS 3.2       Recent Labs     05/18/24  0545   ALKPHOS 69   ALT 11   AST 20   BILITOT 0.5       Recent Labs     05/18/24 0545   INR 1.1       No results for input(s): \"CKTOTAL\", \"TROPONINI\" in the last 72 hours.    Chronic labs:    Lab Results   Component Value Date    TSH 5.98 (H) 05/16/2024    INR 1.1 05/18/2024       Radiology: REVIEWED DAILY    +++++++++++++++++++++++++++++++++++++++++++++++++  Renzo Downing MD   Hospitalist  [place], [place]  +++++++++++++++++++++++++++++++++++++++++++++++++  NOTE: This report was transcribed using voice recognition software. Every effort was made to ensure accuracy; however, inadvertent computerized transcription errors may be present.       
[] Low, [] Moderate,[]  High  Patient's risk as above due to        Medications:  REVIEWED DAILY    Infusion Medications    lactated ringers IV soln 100 mL/hr at 05/17/24 1044     Scheduled Medications    potassium chloride  20 mEq Oral Once    levETIRAcetam  1,000 mg IntraVENous Q12H    pantoprazole (PROTONIX) 40 mg in sodium chloride (PF) 0.9 % 10 mL injection  40 mg IntraVENous Daily    heparin (porcine)  5,000 Units SubCUTAneous Q8H     PRN Meds: ondansetron    Labs:     Recent Labs     05/16/24  0545 05/16/24  1122 05/17/24 0444   WBC 8.9 19.0* 10.6   HGB 14.4 13.6 12.5   HCT 41.5 42.6 37.0    390 334       Recent Labs     05/16/24  0545 05/16/24  1122 05/17/24  0444    141 141   K 3.2* 3.7 3.5    105 107   CO2 27 16* 23   BUN 8 6 4*   CREATININE 0.9 0.8 0.8   CALCIUM 9.0 8.3* 8.6   PHOS  --  2.8 2.7       Recent Labs     05/16/24  0545 05/16/24  1122 05/17/24  0444   ALKPHOS 86 80 69   ALT 12 10 10   AST 20 18 15   BILITOT 0.2 0.3 0.5       Recent Labs     05/17/24 0444   INR 1.2       Recent Labs     05/16/24  0545   CKTOTAL 213*       Chronic labs:    Lab Results   Component Value Date    TSH 5.98 (H) 05/16/2024    INR 1.2 05/17/2024       Radiology: REVIEWED DAILY    +++++++++++++++++++++++++++++++++++++++++++++++++  Renzo Downing MD   Hospitalist  [place], [place]  +++++++++++++++++++++++++++++++++++++++++++++++++  NOTE: This report was transcribed using voice recognition software. Every effort was made to ensure accuracy; however, inadvertent computerized transcription errors may be present.

## 2024-05-21 VITALS
RESPIRATION RATE: 16 BRPM | HEART RATE: 83 BPM | BODY MASS INDEX: 19.63 KG/M2 | WEIGHT: 115 LBS | HEIGHT: 64 IN | TEMPERATURE: 97 F | SYSTOLIC BLOOD PRESSURE: 125 MMHG | DIASTOLIC BLOOD PRESSURE: 85 MMHG

## 2024-05-21 PROBLEM — F19.10 POLYSUBSTANCE ABUSE (HCC): Status: ACTIVE | Noted: 2024-05-21

## 2024-05-21 PROBLEM — F43.25 ADJUSTMENT DISORDER WITH MIXED DISTURBANCE OF EMOTIONS AND CONDUCT: Status: ACTIVE | Noted: 2024-05-21

## 2024-05-21 PROBLEM — F32.9 MAJOR DEPRESSION, SINGLE EPISODE: Status: RESOLVED | Noted: 2024-05-20 | Resolved: 2024-05-21

## 2024-05-21 PROBLEM — F60.89 CLUSTER B PERSONALITY DISORDER (HCC): Status: ACTIVE | Noted: 2024-05-21

## 2024-05-21 LAB
CHOLEST SERPL-MCNC: 193 MG/DL
HBA1C MFR BLD: 4.9 % (ref 4–5.6)
HDLC SERPL-MCNC: 48 MG/DL
LDLC SERPL CALC-MCNC: 121 MG/DL
TRIGL SERPL-MCNC: 120 MG/DL
VLDLC SERPL CALC-MCNC: 24 MG/DL

## 2024-05-21 PROCEDURE — 36415 COLL VENOUS BLD VENIPUNCTURE: CPT

## 2024-05-21 PROCEDURE — 83036 HEMOGLOBIN GLYCOSYLATED A1C: CPT

## 2024-05-21 PROCEDURE — 90792 PSYCH DIAG EVAL W/MED SRVCS: CPT | Performed by: NURSE PRACTITIONER

## 2024-05-21 PROCEDURE — 80061 LIPID PANEL: CPT

## 2024-05-21 RX ORDER — NICOTINE 21 MG/24HR
1 PATCH, TRANSDERMAL 24 HOURS TRANSDERMAL DAILY
Qty: 30 PATCH | Refills: 0 | COMMUNITY
Start: 2024-05-22 | End: 2024-06-21

## 2024-05-21 ASSESSMENT — PAIN SCALES - GENERAL: PAINLEVEL_OUTOF10: 0

## 2024-05-21 NOTE — BH NOTE
Pt denies suicidal / homicidal ideations.  Pt denies hallucinations.  Pt is cooperative and has been in the dayroom area.  Pt has organized thought processes, though appears to minimize the actions that brought him into the hospital.

## 2024-05-21 NOTE — CARE COORDINATION
Collateral Contact (MATIAS signed)  Name: Susan Kenney  Relationship: mom and dad (adoptive)   Number: [phone]     Collateral Information: SW called pt's mom Susan [phone] (MATIAS signed) to obtain collateral information and discuss discharge planning. ANTONIO spoke with mom who stated that the pt has been depressed due to an \"on and off break up\" with his ex girlfriend. Mom stated that the pt comes over for dinner every Sunday and he has been depressed due to the break up. Mom stated that the pt was drinking from her understanding (more than normal). Mom stated that the pt was getting impulsive while drinking and he was superficially cutting in the past but recently started doing this again. Mom stated that the ex girlfriend gave the pt her remaining bottle of Wellbutrin and while he was drinking he took them and after taking the medications the pt texted him to see how he was doing and the pt went to his friends home. Mom stated that his ex girlfriend was worried about the pt and went to the friends house, got the pt and took him to the hospital and called his parents and told her what happened. Mom denied any previous suicide attempts. Mom stated that on Friday the pt was still not well but the pt does not remember attempting to end his life and this was impulsive. Mom stated that the pt told her he does not want to die. Mom stated that the pt feels guilty due to things that he said to his ex-girlfriend. Mom stated that the pt was going to therapy at [facility] but the therapist went on leave.     Mom stated that the pt has his own apartment and the pt is able to stay with them when he is discharged from the hospital. Mom stated that she will be able to pick the pt up from the hospital or dad will be able to. Mom denied access to any guns/weapons. Mom stated that she has been caring for the pt's cat. Mom denied finding any other medications in the house.       Mom stated that she feels that

## 2024-05-21 NOTE — GROUP NOTE
Shared goal for the day as to remain calm.                                                                       Group Therapy Note    Date: 5/21/2024    Group Start Time: 0930  Group End Time: 0945  Group Topic: Community Meeting    [facility]    Carine Waller, JOSIE    Type of Group: Psychoeducation      Patient's Goal:  Patient will be able to id staffing assignments, expectations of patients, and general information re: floor rules. Will be prompted to share goal for the day.     Notes:  Patient appeared to be an active listener, taking in information presented and was prompted to share goal for the day.    Status After Intervention:  Improved    Participation Level: Active Listener and Interactive    Participation Quality: Appropriate, Attentive, and Sharing      Speech:  normal      Thought Process/Content: Logical      Affective Functioning: Congruent      Mood: euthymic      Level of consciousness:  Oriented x4      Response to Learning: Able to verbalize/acknowledge new learning      Endings: None Reported    Modes of Intervention: Education, Support, and Socialization      Discipline Responsible: Psychoeducational Specialist      Signature:  JOSIE Colon

## 2024-05-21 NOTE — GROUP NOTE
Group Therapy Note    Date: 5/21/2024    Group Start Time: 0945  Group End Time: 1020  Group Topic: Psychoeducation    [facility]    Carine Waller, CTRS    Date: 5/21/2024  Module Name:  creating a healthy routine     Patient's Goal:  pt will be able to id keys steps to being successful in a new routine.    Notes:  pleasant and engaged, willing to share important parts of his/her current schedule. Accepting of handout.     Status After Intervention:  Improved    Participation Level: Active Listener and Interactive    Participation Quality: Appropriate, Attentive, and Sharing      Speech:  normal      Thought Process/Content: Logical      Affective Functioning: Congruent      Mood: euthymic      Level of consciousness:  Alert, Oriented x4, and Attentive      Response to Learning: Able to verbalize/acknowledge new learning and Able to retain information      Endings: None Reported    Modes of Intervention: Education, Support, Socialization, and Clarifying      Discipline Responsible: Psychoeducational Specialist      Signature:  Carine Waller CTRS

## 2024-05-21 NOTE — BH NOTE
[facility]  Discharge Note    Pt discharged with followings belongings:   Vision - Corrective Lenses: Eyeglasses  Hearing Aid: None   Valuables sent home with pt or returned to patient. Patient educated on aftercare instructions: yes. Patient verbalize understanding of AVS:  yes..    Status EXAM upon discharge:  Mental Status and Behavioral Exam  Normal: Yes  Level of Assistance: Independent/Self  Facial Expression: Brightened  Affect: Appropriate  Level of Consciousness: Alert  Frequency of Checks: 4 times per hour, close  Mood:Normal: No  Mood: Anxious  Motor Activity:Normal: Yes  Eye Contact: Good  Observed Behavior: Cooperative  Sexual Misconduct History: Current - no  Preception: [place] to person, [place] to time, [place] to place, [place] to situation  Attention:Normal: Yes  Thought Processes: Circumstantial  Thought Content:Normal: No  Thought Content: Preoccupations  Depression Symptoms: Feelings of helplessness  Anxiety Symptoms: Generalized  Imelda Symptoms: No problems reported or observed.  Hallucinations: None  Delusions: No  Memory:Normal: Yes  Insight and Judgment: No  Insight and Judgment: Poor insight    Tobacco Screening:  Practical Counseling, on admission, damien X, if applicable and completed (first 3 are required if patient doesn't refuse):            ( ) Recognizing danger situations (included triggers and roadblocks)                    ( ) Coping skills (new ways to manage stress,relaxation techniques, changing routine, distraction)                                                           ( ) Basic information about quitting (benefits of quitting, techniques in how to quit, available resources  ( ) [facility]                                                                                                                   ( ) Patient refused counseling  ( xx) Patient refused referral  ( ) Patient refused prescription upon discharge  (

## 2024-05-21 NOTE — GROUP NOTE
Group Therapy Note    Date: 5/21/2024    Group Start Time: 1045  Group End Time: 1120  Group Topic: Cognitive Skills    [facility]    Jamila Vann MSW, LSW        Group Therapy Note    Attendees: 13       Patient's Goal:  pt will be able to identify the signs and symptoms of anxiety and different coping skills to help manage the anxiety.     Notes:  Pt participated in group and made connections.     Status After Intervention:  Improved    Participation Level: Active Listener and Interactive    Participation Quality: Appropriate, Attentive, Sharing, and Supportive      Speech:  normal      Thought Process/Content: Logical  Linear      Affective Functioning: Congruent      Mood: anxious      Level of consciousness:  Alert, Oriented x4, and Attentive      Response to Learning: Able to verbalize current knowledge/experience, Able to verbalize/acknowledge new learning, Able to retain information, and Capable of insight      Endings: None Reported    Modes of Intervention: Education, Support, Socialization, Exploration, Clarifying, and Problem-solving      Discipline Responsible: /Counselor      Signature:  AYALA Arana LSW

## 2024-05-21 NOTE — PLAN OF CARE
Problem: Depression  Goal: Will be euthymic at discharge  Description: INTERVENTIONS:  1. Administer medication as ordered  2. Provide emotional support via 1:1 interaction with staff  3. Encourage involvement in milieu/groups/activities  4. Monitor for social isolation  5/21/2024 0909 by Ammon Wilkerson RN  Outcome: Progressing  5/20/2024 2244 by Kurtis Austin, RN  Outcome: Progressing     Problem: Behavior  Goal: Pt/Family maintain appropriate behavior and adhere to behavioral management agreement, if implemented  Description: INTERVENTIONS:  1. Assess patient/family's coping skills and  non-compliant behavior (including use of illegal substances)  2. Notify security of behavior or suspected illegal substances which indicate the need for search of the family and/or belongings  3. Encourage verbalization of thoughts and concerns in a socially appropriate manner  4. Utilize positive, consistent limit setting strategies supporting safety of patient, staff and others  5. Encourage participation in the decision making process about the behavioral management agreement  6. If a visitor's behavior poses a threat to safety call refer to organization policy.  7. Initiate consult with , Psychosocial CNS, Spiritual Care as appropriate  5/21/2024 0909 by Ammon Wilkerson, RN  Outcome: Progressing  5/20/2024 2244 by Kurtis Austin, RN  Outcome: Progressing

## 2024-05-21 NOTE — PLAN OF CARE
Pt out in the common area, watching TV. Pt denies SI, HI and AVH. Pt states that he did attempt to commit suicide but states, \"I was being stupid.\" Pt bright and polite with conversation. Pt went to his room to rest.    Problem: Depression  Goal: Will be euthymic at discharge  Description: INTERVENTIONS:  1. Administer medication as ordered  2. Provide emotional support via 1:1 interaction with staff  3. Encourage involvement in milieu/groups/activities  4. Monitor for social isolation  Outcome: Progressing     Problem: Behavior  Goal: Pt/Family maintain appropriate behavior and adhere to behavioral management agreement, if implemented  Description: INTERVENTIONS:  1. Assess patient/family's coping skills and  non-compliant behavior (including use of illegal substances)  2. Notify security of behavior or suspected illegal substances which indicate the need for search of the family and/or belongings  3. Encourage verbalization of thoughts and concerns in a socially appropriate manner  4. Utilize positive, consistent limit setting strategies supporting safety of patient, staff and others  5. Encourage participation in the decision making process about the behavioral management agreement  6. If a visitor's behavior poses a threat to safety call refer to organization policy.  7. Initiate consult with , Psychosocial CNS, Spiritual Care as appropriate  Outcome: Progressing     Problem: Anxiety  Goal: Will report anxiety at manageable levels  Description: INTERVENTIONS:  1. Administer medication as ordered  2. Teach and rehearse alternative coping skills  3. Provide emotional support with 1:1 interaction with staff  Outcome: Progressing

## 2024-05-21 NOTE — CARE COORDINATION
SW received a call from mom again stating that she wants to know what the pt signed and she wants to come in for visitation. SW transferred call to the RN station.    SW spoke with pt who stated that he is agreeable to doing the Dual IOP program. ANTONIO called Emy in [facility] and scheduled an intake appointment on 6/3 at 2 PM.     [facility]   [address] [address]   Phone: [phone]   Fax: [phone]   Please enter at the main entrance and go down the smith to elevator B, go up  to the 7th floor, check in at the first door in the left hallway.     In order to ensure appropriate transition and discharge planning is in place, the following documents have been transmitted to [facility], as the new outpatient provider:    The d/c diagnosis was transmitted to the next care provider  The reason for hospitalization was transmitted to the next care provider  The d/c medications (dosage and indication) were transmitted to the next care provider   The continuing care plan was transmitted to the next care provider

## 2024-05-21 NOTE — BH NOTE
[facility]  Initial Interdisciplinary Treatment Plan Note      Original treatment plan Date & Time: 5-21-24   945 am    Admission Type:  Admission Type: Involuntary    Reason for admission:   Reason for Admission: \"Drunk and suicide attempt\" pr pt.    Estimated Length of Stay:  5-7days  Estimated Discharge Date: To be determined by physician.    PATIENT STRENGTHS:  Patient Strengths:   Patient Strengths and Limitations:Limitations: Inappropriate/potentially harmful leisure interests, Difficult relationships / poor social skills, Demonstrates discomfort with /lack of social skills  Addictive Behavior: Addictive Behavior  In the Past 3 Months, Have You Felt or Has Someone Told You That You Have a Problem With  : None  Medical Problems:  Past Medical History:   Diagnosis Date    Depression      Status EXAM:Mental Status and Behavioral Exam  Normal: Yes  Level of Assistance: Independent/Self  Facial Expression: Brightened  Affect: Appropriate  Level of Consciousness: Alert  Frequency of Checks: 4 times per hour, close  Mood:Normal: No  Mood: Anxious  Motor Activity:Normal: Yes  Eye Contact: Good  Observed Behavior: Cooperative  Sexual Misconduct History: Current - no  Preception: [place] to person, [place] to time, [place] to place, [place] to situation  Attention:Normal: Yes  Thought Processes: Circumstantial  Thought Content:Normal: No  Thought Content: Preoccupations  Depression Symptoms: Feelings of helplessness  Anxiety Symptoms: Generalized  Imelda Symptoms: No problems reported or observed.  Hallucinations: None  Delusions: No  Memory:Normal: Yes  Insight and Judgment: No  Insight and Judgment: Poor insight    EDUCATION:   Learner Progress Toward Treatment Goals: Will review group plans and strategies for care.    Method: Group therapy, Medication compliance, Individualized assessments and Care planning.    Outcome: Needs Reinforcement    PATIENT GOALS: To be discussed with patient within 72

## 2024-05-21 NOTE — DISCHARGE INSTRUCTIONS
[facility]  Location: [address] [place] [address]  Phone: [phone]  Fax number [phone]     Follow up for Tobacco Cessation at:    [facility]                                 Date:  Friday 5/24 at 10am              [address]    [place], [place] [address]   (Inside [facility]    take B elevators to 7th floor)   Phone: [phone]   Fax: [phone]     Patient was offered a referral to substance abuse treatment, patient declined referral at this time.

## 2024-05-21 NOTE — CARE COORDINATION
SW received a voicemail from pt's mom Susan [phone] (MATIAS signed) stating that she needs to speak with SW because the pt told her he signed something with the word probate on it and she wants to know what the pt signed and why he was told that he would be here for a couple of days. Mom is requesting a call back ASAP.

## 2024-05-21 NOTE — TRANSITION OF CARE
Behavioral [facility] Transition Record    Patient Name: Ho Meade  YOB: 2000   Medical Record Number: [id number]  Date of Admission: 5/20/2024  2:27 PM   Date of Discharge: 5/21/24    Attending Provider: Olivia Farrell MD   Discharging Provider: Dr. Jami Abarca.   To contact this individual call [phone] and ask the  to page.  If unavailable, ask to be transferred to Behavioral Health Provider on call.  A Behavioral Health Provider will be available on call 24/7 and during holidays.    Primary Care Provider: No primary care provider on file.    No Known Allergies    Reason for Admission:  Ho Meade has a past psychiatric history of depression and anxiety presented to the [place] ED after patient intentionally overdosed on Wellbutrin patient was transferred to [facility] ICU after he was noted to have seizure-like activity lasting 20 seconds patient was placed on an involuntary hold by the physician was recommended for inpatient psychiatric hospitalization by psychiatric consult team.  Precipitating factors include substance abuse and recent break-up with girlfriend duration of onset of symptoms just prior to arrival.    Admission Diagnosis: Major depression, single episode [F32.9]    * No surgery found *    Results for orders placed or performed during the hospital encounter of 05/20/24   Hemoglobin A1C   Result Value Ref Range    Hemoglobin A1C 4.9 4.0 - 5.6 %   Lipid Panel   Result Value Ref Range    Cholesterol, Total 193 <200 mg/dL    HDL 48 >40 mg/dL    LDL Cholesterol 121 (H) <100 mg/dL    Triglycerides 120 <150 mg/dL    VLDL 24 mg/dL       Immunizations administered during this encounter:   Immunization History   Administered Date(s) Administered    COVID-19, PFIZER PURPLE top, DILUTE for use, (age 12 y+), 30mcg/0.3mL 08/20/2021, 09/10/2021     Influenza Vaccination Status: None of the above/Not documented/Unable to determine from medical record

## 2024-05-21 NOTE — CARE COORDINATION
CHET received a call from pt's mom requesting to know if the pt saw the Dr. And NP. Chet informed mom that SW is unsure of the providers schedules but they will be seeing the pt today. Mom requested to speak with pt and CHET transferred the call.

## 2024-05-21 NOTE — H&P
Department of Psychiatry  History and Physical - Adult     CHIEF COMPLAINT: \"I made a really big mistake.\"    Patient was seen after discussing with the treatment team and reviewing the chart    CIRCUMSTANCES OF ADMISSION: Ho Meade has a past psychiatric history of depression and anxiety presented to the [place] ED after patient intentionally overdosed on Wellbutrin patient was transferred to [facility] ICU after he was noted to have seizure-like activity lasting 20 seconds patient was placed on an involuntary hold by the physician was recommended for inpatient psychiatric hospitalization by psychiatric consult team.  Precipitating factors include substance abuse and recent break-up with girlfriend duration of onset of symptoms just prior to arrival    HISTORY OF PRESENT ILLNESS:      The patient is a 23 y.o. male with significant past history of depression and anxiety presented to the [place] ED after patient intentionally overdosed on Wellbutrin in the ED patient's urine drug screen was positive for amphetamines, benzodiazepines and cannabis his blood alcohol level was 43 patient was transferred to [facility] ICU after he was noted to have seizure-like activity lasting 20 seconds patient was placed on an involuntary hold by the physician was recommended for inpatient psychiatric hospitalization by psychiatric consult team.  Precipitating factors include substance abuse and recent break-up with girlfriend duration of onset of symptoms just prior to arrival      Upon evaluation today patient was evaluated by myself and the medical director.  He states that he made a really big mistake that he got drunk and took some of his ex-girlfriend's medications.  He admitted that he took some of her Wellbutrin also admitted that he took some of her Adderall.  He states that he was upset because his ex-girlfriend does not love him any longer.  He states that they have been on and off again for 2 years but that 2 weeks

## 2024-05-21 NOTE — DISCHARGE SUMMARY
paranoid thoughts  No Hopeless or worthless feeling  No active SI/HI  Appetite:  [x] Normal  [] Increased  [] Decreased    Sleep:       [x] Normal  [] Fair       [] Poor            Energy:    [x] Normal  [] Increased  [] Decreased     SI [] Present  [x] Absent  HI  []Present  [x] Absent   Aggression:  [] yes  [x] no  Patient is [x] able  [] unable to CONTRACT FOR SAFETY   Medication side effects(SE):  [x] None(Psych. Meds.) [] Other      Mental Status Examination on discharge:    Level of consciousness:  within normal limits   Appearance:  well-appearing  Behavior/Motor:  no abnormalities noted  Attitude toward examiner:  attentive and good eye contact  Speech:  spontaneous, normal rate and normal volume   Mood: \"I am feeling better.\"  Affect: Appropriate and pleasant  Thought processes: Linear without flight of ideas loose associations  Thought content: Devoid of any auditory visual hallucinations delusions or any other perceptual abnormalities.  Denies suicidal or homicidal ideations intent or plan  Cognition:  oriented to person, place, and time   Concentration intact  Memory intact  Insight good   Judgement fair   Fund of Knowledge adequate      ASSESSMENT:  Patient symptoms are:  [x] Well controlled  [x] Improving  [] Worsening  [] No change    Reason for more than one antipsychotic:  [x] N/A  [] 3 Failed Monotherapy attempts (Drugs tried:)  [] Crossover to a new antipsychotic  [] Taper to Monotherapy from Polypharmacy  [] Augmentation of clozapine therapy due to treatment resistance to single therapy    Diagnosis:  Principal Problem:    Adjustment disorder with mixed disturbance of emotions and conduct  Active Problems:    Polysubstance abuse (HCC)    Cluster B personality disorder (HCC)  Resolved Problems:    Major depression, single episode      LABS:    No results for input(s): \"WBC\", \"HGB\", \"PLT\" in the last 72 hours.  No results for input(s): \"NA\", \"K\", \"CL\", \"CO2\", \"BUN\", \"CREATININE\", \"GLUCOSE\" in the

## 2024-07-17 ENCOUNTER — OFFICE VISIT (OUTPATIENT)
Dept: FAMILY MEDICINE CLINIC | Age: 24
End: 2024-07-17
Payer: COMMERCIAL

## 2024-07-17 VITALS
DIASTOLIC BLOOD PRESSURE: 64 MMHG | BODY MASS INDEX: 20.41 KG/M2 | OXYGEN SATURATION: 97 % | HEART RATE: 77 BPM | WEIGHT: 115.2 LBS | RESPIRATION RATE: 18 BRPM | SYSTOLIC BLOOD PRESSURE: 109 MMHG | HEIGHT: 63 IN

## 2024-07-17 DIAGNOSIS — R56.9 PROVOKED SEIZURE (HCC): ICD-10-CM

## 2024-07-17 DIAGNOSIS — F19.10 POLYSUBSTANCE ABUSE (HCC): ICD-10-CM

## 2024-07-17 DIAGNOSIS — F43.25 ADJUSTMENT DISORDER WITH MIXED DISTURBANCE OF EMOTIONS AND CONDUCT: Primary | ICD-10-CM

## 2024-07-17 PROBLEM — T50.902A INTENTIONAL OVERDOSE (HCC): Status: RESOLVED | Noted: 2024-05-16 | Resolved: 2024-07-17

## 2024-07-17 PROCEDURE — 99203 OFFICE O/P NEW LOW 30 MIN: CPT | Performed by: STUDENT IN AN ORGANIZED HEALTH CARE EDUCATION/TRAINING PROGRAM

## 2024-07-17 SDOH — ECONOMIC STABILITY: TRANSPORTATION INSECURITY
IN THE PAST 12 MONTHS, HAS LACK OF TRANSPORTATION KEPT YOU FROM MEETINGS, WORK, OR FROM GETTING THINGS NEEDED FOR DAILY LIVING?: NO

## 2024-07-17 SDOH — SOCIAL STABILITY: SOCIAL INSECURITY: WITHIN THE LAST YEAR, HAVE YOU BEEN HUMILIATED OR EMOTIONALLY ABUSED IN OTHER WAYS BY YOUR PARTNER OR EX-PARTNER?: NO

## 2024-07-17 SDOH — SOCIAL STABILITY: SOCIAL INSECURITY: WITHIN THE LAST YEAR, HAVE YOU BEEN AFRAID OF YOUR PARTNER OR EX-PARTNER?: YES

## 2024-07-17 SDOH — ECONOMIC STABILITY: INCOME INSECURITY: IN THE LAST 12 MONTHS, WAS THERE A TIME WHEN YOU WERE NOT ABLE TO PAY THE MORTGAGE OR RENT ON TIME?: NO

## 2024-07-17 SDOH — SOCIAL STABILITY: SOCIAL INSECURITY
WITHIN THE LAST YEAR, HAVE YOU BEEN KICKED, HIT, SLAPPED, OR OTHERWISE PHYSICALLY HURT BY YOUR PARTNER OR EX-PARTNER?: NO

## 2024-07-17 SDOH — HEALTH STABILITY: MENTAL HEALTH: HOW OFTEN DO YOU HAVE A DRINK CONTAINING ALCOHOL?: 4 OR MORE TIMES A WEEK

## 2024-07-17 SDOH — HEALTH STABILITY: MENTAL HEALTH
STRESS IS WHEN SOMEONE FEELS TENSE, NERVOUS, ANXIOUS, OR CAN'T SLEEP AT NIGHT BECAUSE THEIR MIND IS TROUBLED. HOW STRESSED ARE YOU?: VERY MUCH

## 2024-07-17 SDOH — SOCIAL STABILITY: SOCIAL NETWORK: ARE YOU MARRIED, WIDOWED, DIVORCED, SEPARATED, NEVER MARRIED, OR LIVING WITH A PARTNER?: NEVER MARRIED

## 2024-07-17 SDOH — HEALTH STABILITY: PHYSICAL HEALTH: ON AVERAGE, HOW MANY MINUTES DO YOU ENGAGE IN EXERCISE AT THIS LEVEL?: 30 MIN

## 2024-07-17 SDOH — ECONOMIC STABILITY: FOOD INSECURITY: WITHIN THE PAST 12 MONTHS, THE FOOD YOU BOUGHT JUST DIDN'T LAST AND YOU DIDN'T HAVE MONEY TO GET MORE.: NEVER TRUE

## 2024-07-17 SDOH — ECONOMIC STABILITY: HOUSING INSECURITY: IN THE LAST 12 MONTHS, HOW MANY PLACES HAVE YOU LIVED?: 1

## 2024-07-17 SDOH — SOCIAL STABILITY: SOCIAL NETWORK
DO YOU BELONG TO ANY CLUBS OR ORGANIZATIONS SUCH AS CHURCH GROUPS UNIONS, FRATERNAL OR ATHLETIC GROUPS, OR SCHOOL GROUPS?: NO

## 2024-07-17 SDOH — ECONOMIC STABILITY: TRANSPORTATION INSECURITY
IN THE PAST 12 MONTHS, HAS THE LACK OF TRANSPORTATION KEPT YOU FROM MEDICAL APPOINTMENTS OR FROM GETTING MEDICATIONS?: NO

## 2024-07-17 SDOH — SOCIAL STABILITY: SOCIAL NETWORK: HOW OFTEN DO YOU ATTEND CHURCH OR RELIGIOUS SERVICES?: NEVER

## 2024-07-17 SDOH — ECONOMIC STABILITY: FOOD INSECURITY: WITHIN THE PAST 12 MONTHS, YOU WORRIED THAT YOUR FOOD WOULD RUN OUT BEFORE YOU GOT MONEY TO BUY MORE.: NEVER TRUE

## 2024-07-17 SDOH — SOCIAL STABILITY: SOCIAL INSECURITY
WITHIN THE LAST YEAR, HAVE TO BEEN RAPED OR FORCED TO HAVE ANY KIND OF SEXUAL ACTIVITY BY YOUR PARTNER OR EX-PARTNER?: NO

## 2024-07-17 SDOH — ECONOMIC STABILITY: HOUSING INSECURITY
IN THE LAST 12 MONTHS, WAS THERE A TIME WHEN YOU DID NOT HAVE A STEADY PLACE TO SLEEP OR SLEPT IN A SHELTER (INCLUDING NOW)?: NO

## 2024-07-17 SDOH — HEALTH STABILITY: MENTAL HEALTH: HOW MANY STANDARD DRINKS CONTAINING ALCOHOL DO YOU HAVE ON A TYPICAL DAY?: 1 OR 2

## 2024-07-17 SDOH — ECONOMIC STABILITY: INCOME INSECURITY: HOW HARD IS IT FOR YOU TO PAY FOR THE VERY BASICS LIKE FOOD, HOUSING, MEDICAL CARE, AND HEATING?: NOT HARD AT ALL

## 2024-07-17 SDOH — SOCIAL STABILITY: SOCIAL NETWORK
IN A TYPICAL WEEK, HOW MANY TIMES DO YOU TALK ON THE PHONE WITH FAMILY, FRIENDS, OR NEIGHBORS?: MORE THAN THREE TIMES A WEEK

## 2024-07-17 SDOH — HEALTH STABILITY: PHYSICAL HEALTH: ON AVERAGE, HOW MANY DAYS PER WEEK DO YOU ENGAGE IN MODERATE TO STRENUOUS EXERCISE (LIKE A BRISK WALK)?: 4 DAYS

## 2024-07-17 SDOH — SOCIAL STABILITY: SOCIAL NETWORK: HOW OFTEN DO YOU GET TOGETHER WITH FRIENDS OR RELATIVES?: ONCE A WEEK

## 2024-07-17 SDOH — SOCIAL STABILITY: SOCIAL NETWORK: HOW OFTEN DO YOU ATTENT MEETINGS OF THE CLUB OR ORGANIZATION YOU BELONG TO?: NEVER

## 2024-07-17 NOTE — PROGRESS NOTES
Patient:  Ho Meade 23 y.o. male     07/17/24      Chiefcomplaint:   Chief Complaint   Patient presents with    Established New Doctor     Problems and Overview   New pt    MDD with suicide attempt after breakup. Polysubstance abuse at the time. Likely provoked seizure. No history of this. Not discharged on medication. Mri brain ok.   Other labs appropriate. Mild increase tsh.   Following psycare now. No medication currently. Feels he is getting back on track. No si, hi.     Working at [employer]    No current concerns    Patient Active Problem List    Diagnosis Date Noted    Adjustment disorder with mixed disturbance of emotions and conduct 05/21/2024      Prevention:  Health Maintenance Due   Topic Date Due    Pneumococcal 0-64 years Vaccine (1 of 2 - PCV) Never done    Hepatitis A vaccine (2 of 2 - 2-dose series) 04/07/2014    HPV vaccine (2 - Male 2-dose series) 04/07/2014    HIV screen  Never done    Hepatitis C screen  Never done    COVID-19 Vaccine (3 - 2023-24 season) 09/01/2023    DTaP/Tdap/Td vaccine (8 - Td or Tdap) 10/07/2023      Meds prior:  No current outpatient medications     Physical Exam   Vitals: /64   Pulse 77   Resp 18   Ht 1.588 m (5' 2.5\")   Wt 52.3 kg (115 lb 3.2 oz)   SpO2 97%   BMI 20.73 kg/m²   General Appearance: Alert, oriented, no acute distress  HEENT: No scleral icterus. No visible discharge from eyes  Neck: Not rigid. No visible masses  Chest wall/Lung: Clear to auscultation bilaterally,  respirations unlabored. No ronchi/wheezing/rales  Heart: RRR, no murmur  Abdomen: Soft, nontender  Extremities:  No edema  Skin: No rashes. No jaundice  Neuro: Alert and oriented        Psych: Appropriate mood and appropriate affect  Assessment and Plan   Fu hospitalization and to establish care.  Currently stable mental health with appropriate follow up.   Continue routine care      Adjustment disorder with mixed disturbance of emotions and conduct  Provoked seizure

## 2024-08-06 ENCOUNTER — OFFICE VISIT (OUTPATIENT)
Dept: PRIMARY CARE CLINIC | Age: 24
End: 2024-08-06
Payer: COMMERCIAL

## 2024-08-06 VITALS
OXYGEN SATURATION: 97 % | BODY MASS INDEX: 20.38 KG/M2 | WEIGHT: 115 LBS | HEIGHT: 63 IN | DIASTOLIC BLOOD PRESSURE: 80 MMHG | HEART RATE: 100 BPM | TEMPERATURE: 98.4 F | SYSTOLIC BLOOD PRESSURE: 123 MMHG

## 2024-08-06 DIAGNOSIS — H10.9 BACTERIAL CONJUNCTIVITIS: Primary | ICD-10-CM

## 2024-08-06 PROCEDURE — 99213 OFFICE O/P EST LOW 20 MIN: CPT

## 2024-08-06 RX ORDER — OFLOXACIN 3 MG/ML
SOLUTION/ DROPS OPHTHALMIC
Qty: 10 ML | Refills: 0 | Status: SHIPPED | OUTPATIENT
Start: 2024-08-06 | End: 2024-08-13

## 2024-08-06 RX ORDER — FLUOXETINE HYDROCHLORIDE 20 MG/1
20 CAPSULE ORAL DAILY
COMMUNITY
Start: 2024-07-30

## 2024-08-06 NOTE — PROGRESS NOTES
Chief Complaint:   Eye Problem (Right eye swelling and redness for 2 months. Pt states he was sleeping with contacts in previously but has stopped using them 2 weeks ago )      History of Present Illness   Source of history provided by:  patient.     Ho Meade is a 24 y.o. old male presenting to walk-in with redness, itching, mild irritation, and abnormal drainage to the right eye for the past several days.  States there was no obvious FB exposure.  Denies had a recent URI within the past week.  He reports that he uses his daily contacts constantly and has been sleeping in them.  Denies any visual changes, visual loss, HA, ear pain, fever, chills, N/V, or lethargy.      Circumstances:    [x]  Contact Lens Use     []  Recent URI Sx's     []  Spontaneous Onset     []  Close Contact w/similar Sx's     []  Work Related     History of:     []   Glaucoma     []   Recent Eye Surgery     Review of Systems    Unless otherwise stated in this report or unable to obtain because of the patient's clinical or mental status as evidenced by the medical record, this patients's positive and negative responses for Review of Systems, constitutional, psych, eyes, ENT, cardiovascular, respiratory, gastrointestinal, neurological, genitourinary, musculoskeletal, integument systems and systems related to the presenting problem are either stated in the preceding or were not pertinent or were negative for the symptoms and/or complaints related to the medical problem.    Past Medical History:  has a past medical history of Depression, Intentional overdose (HCC), Polysubstance abuse (HCC), and Provoked seizure (HCC).   Past Surgical History:  has no past surgical history on file.  Social History:  reports that he has been smoking e-cigarettes. He has never used smokeless tobacco. He reports current alcohol use of about 14.0 standard drinks of alcohol per week. He reports current drug use. Drugs: Marijuana (Weed) and Other-see

## 2025-02-25 NOTE — BH NOTE
[facility]  Admission Note     Admission Type:   Involuntary - Signed in Upon Admission    Reason for admission:  Reason for Admission: \"Drunk and suicide attempt\" pr pt.      Addictive Behavior:   Addictive Behavior  In the Past 3 Months, Have You Felt or Has Someone Told You That You Have a Problem With  : None    Medical Problems:   Past Medical History:   Diagnosis Date    Depression        Status EXAM:  Mental Status and Behavioral Exam  Normal: Yes  Level of Assistance: Independent/Self  Facial Expression: Brightened  Affect: Appropriate  Level of Consciousness: Alert  Frequency of Checks: 4 times per hour, close  Mood:Normal: No  Mood: Anxious  Motor Activity:Normal: Yes  Eye Contact: Good  Observed Behavior: Cooperative  Sexual Misconduct History: Current - no  Preception: [place] to place, [place] to person, [place] to time, [place] to situation  Attention:Normal: Yes  Thought Processes: Unremarkable, Perseveration  Thought Content:Normal: No  Thought Content: Other (comment) (Organized)  Depression Symptoms: Feelings of worthlessness, Feelings of hopelessess, Feelings of helplessness  Anxiety Symptoms: Generalized  Imelda Symptoms: No problems reported or observed.  Hallucinations: None  Delusions: No  Memory:Normal: Yes  Insight and Judgment: No  Insight and Judgment: Poor insight    Tobacco Screening:  Practical Counseling, on admission, damien X, if applicable and completed (first 3 are required if patient doesn't refuse):            ( ) Recognizing danger situations (included triggers and roadblocks)                    ( ) Coping skills (new ways to manage stress,relaxation techniques, changing routine, distraction)                                                           (x ) Basic information about quitting (benefits of quitting, techniques in how to quit, available resources  ( ) [facility] for [facility]ed to [facility]                                                      [facility]  [address]  [place] [address]    February 25, 2025    Mily Grullon  [address]  [address]  [place] [address]      Dear Mily,    I have been attempting to reach you since our last contact. I would like to continue to work with you and provide any additional support you may need on achieving your health care related goals. I would appreciate if you would give me a call at [phone] to let me know if you would like to continue working together. I know that there are many things that can affect our ability to communicate and I hope we can continue to work together.    We are focused on providing you with the highest-quality healthcare experience possible.    Sincerely,    PARKER Peña

## 2025-06-17 ENCOUNTER — OFFICE VISIT (OUTPATIENT)
Dept: FAMILY MEDICINE CLINIC | Age: 25
End: 2025-06-17
Payer: COMMERCIAL

## 2025-06-17 VITALS
TEMPERATURE: 97.7 F | OXYGEN SATURATION: 97 % | RESPIRATION RATE: 18 BRPM | HEIGHT: 63 IN | DIASTOLIC BLOOD PRESSURE: 81 MMHG | HEART RATE: 83 BPM | BODY MASS INDEX: 24.34 KG/M2 | WEIGHT: 137.4 LBS | SYSTOLIC BLOOD PRESSURE: 130 MMHG

## 2025-06-17 DIAGNOSIS — B96.89 ACUTE BACTERIAL SINUSITIS: Primary | ICD-10-CM

## 2025-06-17 DIAGNOSIS — J01.90 ACUTE BACTERIAL SINUSITIS: Primary | ICD-10-CM

## 2025-06-17 PROCEDURE — 99213 OFFICE O/P EST LOW 20 MIN: CPT | Performed by: STUDENT IN AN ORGANIZED HEALTH CARE EDUCATION/TRAINING PROGRAM

## 2025-06-17 SDOH — ECONOMIC STABILITY: FOOD INSECURITY: WITHIN THE PAST 12 MONTHS, YOU WORRIED THAT YOUR FOOD WOULD RUN OUT BEFORE YOU GOT MONEY TO BUY MORE.: NEVER TRUE

## 2025-06-17 SDOH — ECONOMIC STABILITY: FOOD INSECURITY: WITHIN THE PAST 12 MONTHS, THE FOOD YOU BOUGHT JUST DIDN'T LAST AND YOU DIDN'T HAVE MONEY TO GET MORE.: NEVER TRUE

## 2025-06-17 ASSESSMENT — PATIENT HEALTH QUESTIONNAIRE - PHQ9
SUM OF ALL RESPONSES TO PHQ QUESTIONS 1-9: 0
SUM OF ALL RESPONSES TO PHQ QUESTIONS 1-9: 0
1. LITTLE INTEREST OR PLEASURE IN DOING THINGS: NOT AT ALL
2. FEELING DOWN, DEPRESSED OR HOPELESS: NOT AT ALL
SUM OF ALL RESPONSES TO PHQ QUESTIONS 1-9: 0
SUM OF ALL RESPONSES TO PHQ QUESTIONS 1-9: 0

## 2025-06-17 NOTE — PROGRESS NOTES
Patient:  Ho Meade 24 y.o. male     06/17/25      Chiefcomplaint:   Chief Complaint   Patient presents with    Cough     X 2 weeks      Congestion    Heartburn     History of Present Illness   2 weeks sinus and cough and drainage with some hoarseness. Initial possible fever, not now  No underlying lung dz       Health Maintenance Due   Topic Date Due    Hepatitis A vaccine (2 of 2 - 2-dose series) 04/07/2014    HPV vaccine (2 - Male 2-dose series) 04/07/2014    HIV screen  Never done    Hepatitis C screen  Never done    Pneumococcal 0-49 years Vaccine (1 of 2 - PCV) Never done    DTaP/Tdap/Td vaccine (8 - Td or Tdap) 10/07/2023    COVID-19 Vaccine (3 - 2024-25 season) 09/01/2024    Depression Screen  05/20/2025      History:  Prior to Visit Medications    Medication Sig Taking? Authorizing Provider   amoxicillin-clavulanate (AUGMENTIN) 875-125 MG per tablet Take 1 tablet by mouth 2 times daily for 7 days Yes Scott Rosales DO   FLUoxetine (PROZAC) 20 MG capsule Take 1 capsule by mouth daily  Patient not taking: Reported on 6/17/2025  Provider, MD Kelvin      Past Medical History:   Diagnosis Date    Depression     Intentional overdose (HCC) 05/16/2024    Polysubstance abuse (HCC) 05/21/2024    Provoked seizure ([facility]) 05/17/2024     Physical Exam   Vitals: /81   Pulse 83   Temp 97.7 °F (36.5 °C)   Resp 18   Ht 1.588 m (5' 2.5\")   Wt 62.3 kg (137 lb 6.4 oz)   SpO2 97%   BMI 24.73 kg/m²   General Appearance: Alert, oriented, no acute distress  HEENT: No scleral icterus. No visible discharge from eyes  Neck: Not rigid. No visible masses  Chest wall/Lung: Clear to auscultation bilaterally,  respirations unlabored. No ronchi/wheezing/rales  Heart: RRR, no murmur  Extremities:  No edema  Skin: No rashes. No jaundice  Neuro: Alert and oriented        Psych: Appropriate mood and appropriate affect    Assessment and Plan   Start augmentin for sinusitis persisting x 2 weeks    Return if not